# Patient Record
Sex: FEMALE | Race: WHITE | NOT HISPANIC OR LATINO | Employment: FULL TIME | URBAN - METROPOLITAN AREA
[De-identification: names, ages, dates, MRNs, and addresses within clinical notes are randomized per-mention and may not be internally consistent; named-entity substitution may affect disease eponyms.]

---

## 2017-01-10 ENCOUNTER — ALLSCRIPTS OFFICE VISIT (OUTPATIENT)
Dept: OTHER | Facility: OTHER | Age: 32
End: 2017-01-10

## 2017-01-30 ENCOUNTER — ALLSCRIPTS OFFICE VISIT (OUTPATIENT)
Dept: OTHER | Facility: OTHER | Age: 32
End: 2017-01-30

## 2017-03-01 ENCOUNTER — ALLSCRIPTS OFFICE VISIT (OUTPATIENT)
Dept: OTHER | Facility: OTHER | Age: 32
End: 2017-03-01

## 2017-04-05 ENCOUNTER — ALLSCRIPTS OFFICE VISIT (OUTPATIENT)
Dept: OTHER | Facility: OTHER | Age: 32
End: 2017-04-05

## 2017-04-20 ENCOUNTER — ALLSCRIPTS OFFICE VISIT (OUTPATIENT)
Dept: OTHER | Facility: OTHER | Age: 32
End: 2017-04-20

## 2017-04-24 ENCOUNTER — ALLSCRIPTS OFFICE VISIT (OUTPATIENT)
Dept: OTHER | Facility: OTHER | Age: 32
End: 2017-04-24

## 2017-05-15 ENCOUNTER — TRANSCRIBE ORDERS (OUTPATIENT)
Dept: ADMINISTRATIVE | Facility: HOSPITAL | Age: 32
End: 2017-05-15

## 2017-05-15 ENCOUNTER — ALLSCRIPTS OFFICE VISIT (OUTPATIENT)
Dept: OTHER | Facility: OTHER | Age: 32
End: 2017-05-15

## 2017-05-15 DIAGNOSIS — N64.4 MASTODYNIA: ICD-10-CM

## 2017-05-15 DIAGNOSIS — N64.4 BREAST PAIN: Primary | ICD-10-CM

## 2017-05-17 ENCOUNTER — HOSPITAL ENCOUNTER (OUTPATIENT)
Dept: MAMMOGRAPHY | Facility: CLINIC | Age: 32
Discharge: HOME | End: 2017-05-17
Payer: COMMERCIAL

## 2017-05-17 ENCOUNTER — LAB CONVERSION - ENCOUNTER (OUTPATIENT)
Dept: OTHER | Facility: OTHER | Age: 32
End: 2017-05-17

## 2017-05-17 ENCOUNTER — HOSPITAL ENCOUNTER (OUTPATIENT)
Dept: ULTRASOUND IMAGING | Facility: CLINIC | Age: 32
Discharge: HOME | End: 2017-05-17
Payer: COMMERCIAL

## 2017-05-17 DIAGNOSIS — N64.4 MASTODYNIA: ICD-10-CM

## 2017-05-17 LAB
A/G RATIO (HISTORICAL): 1.4 (CALC) (ref 1–2.5)
ALBUMIN SERPL BCP-MCNC: 4.6 G/DL (ref 3.6–5.1)
ALP SERPL-CCNC: 60 U/L (ref 33–115)
ALT SERPL W P-5'-P-CCNC: 14 U/L (ref 6–29)
AST SERPL W P-5'-P-CCNC: 18 U/L (ref 10–30)
BILIRUB SERPL-MCNC: 1 MG/DL (ref 0.2–1.2)
BUN SERPL-MCNC: 12 MG/DL (ref 7–25)
BUN/CREA RATIO (HISTORICAL): NORMAL (CALC) (ref 6–22)
CALCIUM SERPL-MCNC: 9.4 MG/DL (ref 8.6–10.2)
CHLORIDE SERPL-SCNC: 101 MMOL/L (ref 98–110)
CO2 SERPL-SCNC: 27 MMOL/L (ref 20–31)
CREAT SERPL-MCNC: 0.69 MG/DL (ref 0.5–1.1)
DEPRECATED RDW RBC AUTO: 13.6 % (ref 11–15)
EGFR AFRICAN AMERICAN (HISTORICAL): 134 ML/MIN/1.73M2
EGFR-AMERICAN CALC (HISTORICAL): 115 ML/MIN/1.73M2
GAMMA GLOBULIN (HISTORICAL): 3.2 G/DL (CALC) (ref 1.9–3.7)
GLUCOSE (HISTORICAL): 87 MG/DL (ref 65–99)
HCT VFR BLD AUTO: 40.9 % (ref 35–45)
HGB BLD-MCNC: 13.5 G/DL (ref 11.7–15.5)
MCH RBC QN AUTO: 30.9 PG (ref 27–33)
MCHC RBC AUTO-ENTMCNC: 33 G/DL (ref 32–36)
MCV RBC AUTO: 93.7 FL (ref 80–100)
PLATELET # BLD AUTO: 206 THOUSAND/UL (ref 140–400)
PMV BLD AUTO: 9.1 FL (ref 7.5–12.5)
POTASSIUM SERPL-SCNC: 4.2 MMOL/L (ref 3.5–5.3)
RBC # BLD AUTO: 4.37 MILLION/UL (ref 3.8–5.1)
SODIUM SERPL-SCNC: 136 MMOL/L (ref 135–146)
TOTAL PROTEIN (HISTORICAL): 7.8 G/DL (ref 6.1–8.1)
TSH SERPL DL<=0.05 MIU/L-ACNC: 2.59 MIU/L
WBC # BLD AUTO: 7.1 THOUSAND/UL (ref 3.8–10.8)

## 2017-05-17 PROCEDURE — 76642 ULTRASOUND BREAST LIMITED: CPT

## 2017-05-17 PROCEDURE — G0204 DX MAMMO INCL CAD BI: HCPCS

## 2017-05-18 ENCOUNTER — LAB CONVERSION - ENCOUNTER (OUTPATIENT)
Dept: OTHER | Facility: OTHER | Age: 32
End: 2017-05-18

## 2017-05-18 LAB
CONTACT: (HISTORICAL): NORMAL
LYME IGG/IGM AB (HISTORICAL): <0.9 INDEX
TEST INFORMATION (HISTORICAL): NORMAL
TEST NAME (HISTORICAL): NORMAL

## 2017-05-23 ENCOUNTER — GENERIC CONVERSION - ENCOUNTER (OUTPATIENT)
Dept: OTHER | Facility: OTHER | Age: 32
End: 2017-05-23

## 2017-06-23 ENCOUNTER — GENERIC CONVERSION - ENCOUNTER (OUTPATIENT)
Dept: OTHER | Facility: OTHER | Age: 32
End: 2017-06-23

## 2017-06-26 ENCOUNTER — ALLSCRIPTS OFFICE VISIT (OUTPATIENT)
Dept: OTHER | Facility: OTHER | Age: 32
End: 2017-06-26

## 2017-09-29 ENCOUNTER — GENERIC CONVERSION - ENCOUNTER (OUTPATIENT)
Dept: OTHER | Facility: OTHER | Age: 32
End: 2017-09-29

## 2018-01-09 NOTE — PSYCH
1  Depression with anxiety (300 4) (F41 8)    every other week       Date of Initial Treatment Plan: 9-28-16  Date of Current Treatment Plan: 1-30-17  Treatment Plan 2  Strengths/Personal Resources for Self Care: taxes done, ready to move, finished grad class, good support system  Diagnosis:   Axis I: depression, anxiety, greif reaction   Axis II: none   Axis III: none     Area of Needs: easily drained and overwhelmed as she continues grieving process and preps to move into "new" house  Long Term Goals: To get back into a routine personally and professionally   Target Date: 5-30-17              Short Term Objectives:   Goal 1:   Move into "new" house and get settled emotionally and physically  Target Date: 5-30-17      Goal 2:   work on self- new room, new bed and bedspread, making new space her own  Target Date: 4-10-17      Goal 3:   continuing to give self permission to slow down, take breaks, do one or two things daily and no pressure to do more  Target Date: 5-30-17      GOAL 1: Modality: Individual 2 x per month Target Date: 5-30-17       The person(s) responsible for carrying out the plan is Jonathan Healy  GOAL 2: Modality: Individual 2 x per month Target Date: 5-30-17       The person(s) responsible for carrying out the plan is Jonathan Healy  GOAL 3: Modality: Individual 2 x per month Target Date: 5-30-17         The person(s) responsible for carrying out the plan is Jonathan Healy  The first scheduled review date is 5-30-17  The expected length of service is 6 months  Level of functioning at initial assessment: 50  The highest level of functioning in the past year was 72  The current level of functioning is 70               CLIENT COMMENTS / Please share your thoughts, feelings, need and/or experiences regarding your treatment plan: _____________________________________________________________________________________________________________________________________________________________________________________________________________________________________________________________________________________________________________________ Date/Time: ______________     Patient Signature: _________________________________ Date/Time: ______________        1  Depression with anxiety (300 4) (F41 8)   2  Echogenic focus of heart, fetal, affecting care of mother, antepartum (655 83) (O35 8XX0)   3  Encounter for nuchal translucency testing (V28 89) (Z36)   4  Generalized anxiety disorder (300 02) (F41 1)   5  Grief reaction (309 0) (F43 20)   6  Hereditary Disease Possibly Affecting Fetus, Affecting Care Of Mother - Antepartum   Condition Or Complication (646 25)   7  Herpes simplex type 1 infection (054 9) (B00 9)   8  High risk medication use (V58 69) (Z79 899)   9  Influenza vaccine needed (V04 81) (Z23)   10  Major depressive disorder, single episode, severe without psychotic features (296 23)    (F32 2)   11   Panic Disorder Without Agoraphobia (300 01)     Electronically signed by : Linus Mcnamara, ; Jan 30 2017 11:38AM EST                       (Author)

## 2018-01-09 NOTE — PSYCH
1  Generalized anxiety disorder (300 02) (F41 1)   2  Grief reaction (309 0) (F43 20)    see regularly       Date of Initial Treatment Plan: 9-28-16  Date of Current Treatment Plan: 6-26-17  Treatment Plan 3  Strengths/Personal Resources for Self Care: I do well with being productive, taking last college class, all moved in to new house, staying busy with daughter and spending time with her doing fun things, Yoga is going awesome  I am most proud of "saying no" when I know something will be too much for me  Diagnosis:   Axis I: depression, anxiety, complicated bereavement   Axis II: none     Area of Needs: Stress, complicated bereavement and upcoming anniversary of husbands death  Long Term Goals:   Be able to focus on and enjoy life after all of the "1sts" - gettting my happiness back as close to normal as possible   Target Date: 10-26-17              Short Term Objectives:   Goal 1:   Start reading more - the more I read about other people's trajedys the better I am able to cope - connect to others stories  Target Date: 10-26-17      Goal 2:   Yoga, mindfulness, breathing, centering  Target Date: 10-26-17      Goal 3:   finish college course on mindfulness in the classroom to be able 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    Target Date: 10-26-17      GOAL 1: Modality: Individual 2 x per month Target Date: 10-26-17       The person(s) responsible for carrying out the plan is Julius Lares  GOAL 2: Modality: Individual 2 x per month Target Date: 10-26-17       The person(s) responsible for carrying out the plan is Juliuskaren Leahy Luana Luda  GOAL 3: Modality: Individual 2 x per month Target Date: 10-26-17         The person(s) responsible for carrying out the plan is Julius Lares  The first scheduled review date is 10-26-17  The expected length of service is 6 months  Level of functioning at initial assessment: 60      The highest level of functioning in the past year was 79  The current level of functioning is 60  CLIENT COMMENTS / Please share your thoughts, feelings, need and/or experiences regarding your treatment plan: _____________________________________________________________________________________________________________________________________________________________________________________________________________________________________________________________________________________________________________________ Date/Time: ______________     Patient Signature: _________________________________ Date/Time: ______________        1  Breast pain (611 71) (N64 4)   2  Generalized anxiety disorder (300 02) (F41 1)   3  Grief reaction (309 0) (F43 20)   4  History of Herpes simplex type 1 infection (054 9) (B00 9)   5  Major depressive disorder, single episode, severe without psychotic features (296 23)   (F32 2)   6  Post traumatic stress disorder (309 81) (F43 10)   7   Seborrheic dermatitis (690 10) (L21 9)     Electronically signed by : Gerber Morejon, ; Jun 26 2017 11:31AM EST                       (Author)
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
## 2018-01-11 NOTE — PSYCH
Progress Note  Psychotherapy Provided [facility]: Individual Psychotherapy 45 minutes provided today  Goals addressed in session:   all goals discussed and processed today in session - new tx plan updated today  D: Spent session updating tx plan as I have not seen Deyanira Alejo for therapy since April 24th  She did go to 2 grief groups at [facility] [place] as had discussed but was the youngest person there by about 36 years and while she got some good materials did not feel a true connection to the many elderly people who had been  for 40plus years  She is, however, finding great strength and help in her Yoga/mindfulness classes  Talked about her breathing she is learning while doing yoga and how it helps her every day with her anxiety  We discussed the upcoming anniversary of Jose E's death, her recent vacation with family at the beach and 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  Excited to have a new routine and get things moving in a new direction  A: Tearful talking about upcoming anniversary date of Jose E's death  MOving forward and making great progress  Doing well with assertiveness and self permissions  Pain Scale and Suicide Risk [facility]: Current Pain Assessment: no pain   On a scale of 0 to 10, the patient rates current pain at 0   Current suicide risk is low   Behavioral Health Treatment Plan [facility] [address]: Diagnosis and Treatment Plan explained to patient, patient relates understanding diagnosis and is agreeable to Treatment Plan  Assessment    1  Generalized anxiety disorder (300 02) (F41 1)   2   Grief reaction (309 0) (F43 20)    Plan  see again 1st week of August     Signatures   Electronically signed by : Jarad Ford, ; Jun 26 2017 11:48AM EST                       (Author)

## 2018-01-12 VITALS
DIASTOLIC BLOOD PRESSURE: 60 MMHG | HEIGHT: 67 IN | WEIGHT: 159 LBS | TEMPERATURE: 97.4 F | SYSTOLIC BLOOD PRESSURE: 110 MMHG | BODY MASS INDEX: 24.96 KG/M2
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
## 2018-01-12 NOTE — PSYCH
Provider Comments  Provider Comments:   patient called to cancel      Message   Recorded as Task   Date: 10/03/2016 07:58 AM, Created By: Fern Albert   Task Name: Miscellaneous   Assigned To: Harrison Rose   Regarding Patient: Tim Flores, Status: Active   Comment:    Fern Albert - 03 Oct 2016 7:58 AM     TASK CREATED  Pt cancelled for today, conflict with another appt  She will reschedule     Harrison Rose - 54 Oct 2016 8:08 AM     TASK EDITED     Signatures   Electronically signed by : Linda Lieberman, ; Oct  3 2016  8:09AM EST                       (Author)

## 2018-01-13 VITALS
TEMPERATURE: 98.6 F | DIASTOLIC BLOOD PRESSURE: 80 MMHG | SYSTOLIC BLOOD PRESSURE: 119 MMHG | BODY MASS INDEX: 24.33 KG/M2 | WEIGHT: 155 LBS | HEIGHT: 67 IN

## 2018-01-13 VITALS
WEIGHT: 157 LBS | SYSTOLIC BLOOD PRESSURE: 122 MMHG | TEMPERATURE: 98.5 F | DIASTOLIC BLOOD PRESSURE: 80 MMHG | HEIGHT: 67 IN | BODY MASS INDEX: 24.64 KG/M2

## 2018-01-15 NOTE — PSYCH
Progress Note  Psychotherapy Provided [facility]: Individual Psychotherapy 50 minutes provided today  Goals addressed in session:   Goal #1 addressed   D: Abundio Gregory shares that she has been overwhelmed with Monica Youngl and her birthdays and Thanksgiving this week and Tobyhanna coming up  Also end of HS football season  Processed all of this along with recent meeting in [place] with , ,   Processed grief, anxiety along with this  Discussed concerns she has about raising Monica Xiong  Processed her support network, decisions that have to be made for new house, and changes coming up  Did CBT and supportive therapy  A: Has been busy with holiday planning and birthdays  Also got [employer] done and she and family worked to get foundation dug as company was not moving on that  P: validate progress, listen to body and mind  take breaks, be open about feeligns  Pain Scale and Suicide Risk [facility]: Current Pain Assessment: no pain   On a scale of 0 to 10, the patient rates current pain at 0   Current suicide risk is low   Behavioral Health Treatment Plan [facility]: Diagnosis and Treatment Plan explained to patient, patient relates understanding diagnosis and is agreeable to Treatment Plan  Assessment    1   Grief reaction (309 0) (F43 20)    Plan  see in one week     Signatures   Electronically signed by : Samm Guo, ; Nov 21 2016 10:58AM EST                       (Author)
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
## 2018-01-15 NOTE — PSYCH
Treatment Plan Tracking    #1 Treatment Plan not completed within required time limits due to: Client presented with emotional/behavioral issues that required clinical intervention            Signatures   Electronically signed by : Deonte Morse, ; Marco 10 2017 11:59AM EST                       (Author)

## 2018-01-15 NOTE — PSYCH
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)  We processed her Thanksgiving in [place] and how that went - Aunt had picture of Marco A on the table and they toasted to him  She cried but said it was really good  Talked about how trying Eual Tessa is as she is now 1  Latoya feeling like she is a bad mom at times when she loses her patience  Feels like she "should not" use her parents or siblings or in laws for help but misses having a parenting partner  We did extensive CBT today and permission granting and distress tolerance around holiday expectations and traditions  She is meeting all week with contractors for the house (lighting, mallorie, trim, etc ) as they hope to be in house by end of January  Very busy with that  A: Leola Ruiz 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 as she did discuss today having 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 to see "how she felt" about it  P: Do not put pressure on self to exceed or even meet past holiday expectations  Set one or two goals for Seng Zarate and her over holidays and don't push beyond that  Ask for help and use the "team" around her  Pain Scale and Suicide Risk [facility]: Current Pain Assessment: no pain   On a scale of 0 to 10, the patient rates current pain at 0   Current suicide risk is low   Behavioral Health Treatment Plan [facility]: Diagnosis and Treatment Plan explained to patient, patient relates understanding diagnosis and is agreeable to Treatment Plan  Assessment    1   Depression with anxiety (300 4) (F41 8)    Plan  see in one week     Signatures   Electronically signed by : Rebeca Portillo, ; Nov 29 2016 12:02PM EST                       (Author)

## 2018-01-15 NOTE — PSYCH
Progress Note  Psychotherapy Provided [facility]: Individual Psychotherapy 46 minutes provided today  Goals addressed in session:   Goal #1 and 2 addressed  D: Met with Latoya for individual session  Since we last met she has been busy organizing and planning and taking care of herself  Worked hard on sleep hygiene per our last session - going to bed same time every night - about 9pm and journaling at bedtime  Taking Melatonin and drinking sleepy time tea and not taking the ativan  Says it is "so much better" and she is feeling really improved and is sleeping all night  She is walking more and has met with a friend for lunch  She talked with the bank and her  and hired an  through her  and is working on completing her will  Things better with Onelia's parents and she is having dinner with them tonight for first time in weeks  Has all of the money in her name now  Meeting with the [address] [place] [address] [employer]  owned half of end of this week to have partner "buy her out" for his half  She is considering giving money back to him but will think about it for a few days  Still gets sad  Cries more easily instead of feeling numb  Having T shirt quilts made for her, her daughter, her parents, his parents as he had hundreds of t shirts  She is meeting with contractor today about siding for the house  Talked about how difficult this is as she and onelia painted the siding together so changing it is hard  She says she is looking forward to moving back into her house  Her parents will take their bedroom and she will move into what was Onelia's Den  office  A: So much better - brighter affect, Sleeping seems to be helping with emoting and clarity of thought  P: Continue therapy to work through the grief process  Validate progress  Pain Scale and Suicide Risk [facility]: Current Pain Assessment: no pain   On a scale of 0 to 10, the patient rates current pain at 0   Current suicide risk is low      Behavioral Health Treatment Plan [facility]: Diagnosis and Treatment Plan explained to patient, patient relates understanding diagnosis and is agreeable to Treatment Plan  Assessment    1  Generalized anxiety disorder (300 02) (F41 1)   2   Grief reaction (309 0) (F43 20)    Plan  see in 2 weeks     Signatures   Electronically signed by : Pierre Chavez, ; Oct 12 2016 11:41AM EST                       (Author)
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
## 2018-01-16 NOTE — RESULT NOTES
Verified Results  *US BREAST RIGHT LIMITED (DIAGNOSTIC) [id number] 10:09AM Musa Ruiz     Test Name Result Flag Reference   US BREAST RIGHT LIMITED (Report)     Patient History:   Family history of breast cancer in paternal aunt, ovarian cancer    in paternal cousin, colorectal cancer in paternal uncle  Patient has never smoked  Patient's BMI is 24 9  Reason for exam: clinical finding  Mammo Diagnostic Bilateral W CAD: May 17, 2017 - Check In #:    [de-identified]   Bilateral MLO and CC view(s) were taken  ML, spot compression    MLO, and spot compression CC view(s) were taken of the right    breast      Technologist: ROSARIO Bhardwaj (ROSARIO)(M)   The breast tissue is heterogeneously dense, potentially limiting    the sensitivity of mammography  Patient risk, included in this    report, assists in determining the appropriate screening regimen    (such as 3-D mammography or the inclusion of automated breast    ultrasound or MRI)  3-D mammography may also remain indicated as    screening  Bilateral diagnostic mammography was performed  Square pain marker over the right breast upper-outer quadrant has   no subjacent mammographic abnormality  There are no suspicious masses, grouped microcalcifications or    areas of distortion in either breast      Diagnostic ultrasound of the right breast upper-outer quadrant    was performed by scanning from 9:00 to 12:00  At the area of    maximal pain 10:00 6 cm from nipple, there is no sonographic    abnormality  The remainder of the right breast upper-outer quadrant is    unremarkable  US Breast Right Limited: May 17, 2017 - Check In #: [de-identified]   Standard views  Technologist: Sofia Tello   1  No mammographic evidence of malignancy in either breast    2  No mammographic or sonographic findings to explain patient's    right breast upper-outer quadrant pain   I discussed possible    cause of breast pain with the patient and offered her reassurance   that most breast pain is self-limited  ACR BI-RADSï¾® Assessments: BiRad:1 - Negative (Overall)   Diag Mammogram: BiRad:1 - negative  Right breast Right Brst US: Right breast is BiRad:1 - negative  Recommendation:   Clinical management of both breasts  Routine screening mammogram of both breasts at age 35  Analyzed by CAD     Transcription Location: [facility] [address]   Signing Station: [id number]     Risk Value(s):   Sophieer-Cushellieck 10 Year: 1 300%, Tyrer-Cuzick Lifetime: 22 900%,    Myriad Table: 3 0%, MRS : Based on personal and/or    family history, consideration of hereditary risk assessment may    be warranted  Signed by:   Louann Dc MD   5/17/17     MAMMO DIAGNOSTIC BILATERAL W CAD [id number] 09:33AM Chris Guzman Order Number: [id number]    - Patient Instructions: To schedule this appointment, please contact Central Scheduling at [phone]  Do not wear any perfume, powder, lotion or deodorant on breast or underarm area  Please bring your doctors order, referral (if needed) and insurance information with you on the day of the test  Failure to bring this information may result in this test being rescheduled  Arrive 15 minutes prior to your appointment time to register  On the day of your test, please bring any prior mammogram or breast studies with you that were not performed at a [facility]  Failure to bring prior exams may result in your test needing to be rescheduled  Test Name Result Flag Reference   MAMMO DIAGNOSTIC BILATERAL W CAD (Report)     Patient History:   Family history of breast cancer in paternal aunt, ovarian cancer    in paternal cousin, colorectal cancer in paternal uncle  Patient has never smoked  Patient's BMI is 24 9  Reason for exam: clinical finding  Mammo Diagnostic Bilateral W CAD: May 17, 2017 - Check In #:    [de-identified]   Bilateral MLO and CC view(s) were taken   ML, spot compression    MLO, and spot compression CC view(s) were taken of the right    breast      Technologist: ROSARIO Talamantes (R)(M)   The breast tissue is heterogeneously dense, potentially limiting    the sensitivity of mammography  Patient risk, included in this    report, assists in determining the appropriate screening regimen    (such as 3-D mammography or the inclusion of automated breast    ultrasound or MRI)  3-D mammography may also remain indicated as    screening  Bilateral diagnostic mammography was performed  Square pain marker over the right breast upper-outer quadrant has   no subjacent mammographic abnormality  There are no suspicious masses, grouped microcalcifications or    areas of distortion in either breast      Diagnostic ultrasound of the right breast upper-outer quadrant    was performed by scanning from 9:00 to 12:00  At the area of    maximal pain 10:00 6 cm from nipple, there is no sonographic    abnormality  The remainder of the right breast upper-outer quadrant is    unremarkable  US Breast Right Limited: May 17, 2017 - Check In #: [de-identified]   Standard views  Technologist: Bernabe Mroeno   1  No mammographic evidence of malignancy in either breast    2  No mammographic or sonographic findings to explain patient's    right breast upper-outer quadrant pain  I discussed possible    cause of breast pain with the patient and offered her reassurance   that most breast pain is self-limited  ACR BI-RADSï¾® Assessments: BiRad:1 - Negative (Overall)   Diag Mammogram: BiRad:1 - negative  Right breast Right Brst US: Right breast is BiRad:1 - negative  Recommendation:   Clinical management of both breasts  Routine screening mammogram of both breasts at age 35     Analyzed by CAD     Transcription Location: [address]   Signing Station: [id number]     Risk Value(s):   Shilpa-Haroon 10 Year: 1 300%, Sophieer-Haroon Lifetime: 22 900%,    Myriad Table: 3 0%, MRS : Based on personal and/or    family history, consideration of hereditary risk assessment may    be warranted  Signed by:   Rodger Melchor MD   5/17/17     (1) COMPREHENSIVE METABOLIC PANEL [id number] 36:84AZ Musa Estrad[place]atha     Test Name Result Flag Reference   GLUCOSE 87 mg/dL  65-99   Fasting reference interval   UREA NITROGEN (BUN) 12 mg/dL  7-25   CREATININE 0 69 mg/dL  0 50-1 10   eGFR NON-AFR   AMERICAN 115 mL/min/1 73m2  > OR = 60   eGFR AFRICAN AMERICAN 134 mL/min/1 73m2  > OR = 60   BUN/CREATININE RATIO   4-96   NOT APPLICABLE (calc)   SODIUM 136 mmol/L  135-146   POTASSIUM 4 2 mmol/L  3 5-5 3   CHLORIDE 101 mmol/L     CARBON DIOXIDE 27 mmol/L  20-31   CALCIUM 9 4 mg/dL  8 6-10 2   PROTEIN, TOTAL 7 8 g/dL  6 1-8 1   ALBUMIN 4 6 g/dL  3 6-5 1   GLOBULIN 3 2 g/dL (calc)  1 9-3 7   ALBUMIN/GLOBULIN RATIO 1 4 (calc)  1 0-2 5   BILIRUBIN, TOTAL 1 0 mg/dL  0 2-1 2   ALKALINE PHOSPHATASE 60 U/L     AST 18 U/L  10-30   ALT 14 U/L  6-29     (Q) CBC (H/H, RBC, INDICES, WBC, PLT) [id number] 12:00AM Lokesh Bustillo     Test Name Result Flag Reference   WHITE BLOOD CELL COUNT 7 1 Thousand/uL  3 8-10 8   RED BLOOD CELL COUNT 4 37 Million/uL  3 80-5 10   HEMOGLOBIN 13 5 g/dL  11 7-15 5   HEMATOCRIT 40 9 %  35 0-45 0   MCV 93 7 fL  80 0-100 0   MCH 30 9 pg  27 0-33 0   MCHC 33 0 g/dL  32 0-36 0   RDW 13 6 %  11 0-15 0   PLATELET COUNT 620 Thousand/uL  140-400   MPV 9 1 fL  7 5-12 5     (Q) TSH, 3RD GENERATION W/REFLEX TO FT4 [id number] 12:00AM Lokesh Bustillo     Test Name Result Flag Reference   TSH W/REFLEX TO FT4 2 59 mIU/L     Reference Range                         > or = 20 Years  0 40-4 50                              Pregnancy Ranges            First trimester    0 26-2 66            Second trimester   0 55-2 73            Third trimester    0 43-2 91     (Q) LYME DISEASE AB, TOTAL W/REFL WB (IGG, IGM) [id number] 12:00AM [place]ann[place]isa Ruiz     Test Name Result Flag Reference   LYME AB SCREEN <0 90 index     Index                Interpretation                     ----- --------------                     < 0 90               Negative                     0  90-1 09            Equivocal                     > 1 09               Positive      As recommended by the Food and Drug Administration   (FDA), all samples with positive or equivocal   results in a Borrelia burgdorferi antibody screen  will be tested using a blot method  Positive or   equivocal screening test results should not be   interpreted as truly positive until verified as such   using a supplemental assay (e g , B  burgdorferi blot)  The screening test and/or blot for B  burgdorferi   antibodies may be falsely negative in early stages  of Lyme disease, including the period when erythema   migrans is apparent  (Q) TEST AUTHORIZATION [id number] 12:00AM Dontrell Cadena     Test Name Result Flag Reference   TEST(S) ORDERED ON$REQUISITION      LYME DISEASE AB W/REFL   TEST CODE: [id number]     CLIENT CONTACT: ANNE LOUIE     REPORT ALWAYS MESSAGE$SIGNATURE See Below     The laboratory testing on this patient was verbally requested  or confirmed by the ordering physician or his or her authorized  representative after contact with an employee of [employer]  Federal regulations require that we maintain on file written  authorization for all laboratory testing  Accordingly we are asking  that the ordering physician or his or her authorized representative  sign a copy of this report and promptly return it to the client            Signature:____________________________________________________
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
## 2018-01-17 NOTE — PSYCH
Treatment Plan Tracking    #1 Treatment Plan not completed within required time limits due to: Client presented with emotional/behavioral issues that required clinical intervention  , Other: evaluation today - distraught - grieving  - will do tx plan next session            Signatures   Electronically signed by : Kiana Norton, ; Sep 15 2016 12:07PM EST                       (Author)
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
## 2018-01-18 NOTE — PROGRESS NOTES
Assessment    1  Generalized anxiety disorder (300 02) (F41 1)   2  High risk medication use (V58 69) (Z79 899)    Plan  Generalized anxiety disorder    · Sertraline HCl - 25 MG Oral Tablet; TAKE 1 TABLET DAILY   · Decreasing the stress in your life may help your condition improve ; Status:Complete;    Done: 39TCO4008 04:49PM   · There are many things you can do to help control and end a panic attack ;  Status:Complete;   Done: 18HJZ1645 04:49PM   · Follow-up visit in 6 months Evaluation and Treatment  Follow-up  Status: Hold For -  Scheduling  Requested for: 13SDF2752    Discussion/Summary  Possible side effects of new medications were reviewed with the patient/guardian today  The treatment plan was reviewed with the patient/guardian  The patient/guardian understands and agrees with the treatment plan   The patient was counseled regarding diagnostic results, risk factor reductions, prognosis, impressions, risks and benefits of treatment options  Chief Complaint  3 month recheck      History of Present Illness  The patient is being seen for follow-up of anxiety  The patient reports doing well  She has no comorbid illnesses  Interval symptoms:  stable difficulty concentrating, stable restlessness, denies panic attacks and denies depression    The patient presents with complaints of gradual onset of intermittent episodes of moderate stable anxiety  Episodes years ago  Her symptoms are caused by no known event  Symptoms are improving  Pertinent Medical History: anxiety disorder  Associated symptoms: no suicidal ideation  Medication(s): Sertralin 25 mg daily  Medications:  the patient is adherent to her medication regimen, but she denies medication side effects  Review of Systems    Constitutional: as noted in HPI  Cardiovascular: No complaints of slow heart rate, no fast heart rate, no chest pain, no palpitations, no leg claudication, no lower extremity edema     Respiratory: No complaints of shortness of breath, no wheezing, no cough, no SOB on exertion, no orthopnea, no PND  Psychiatric: as noted in HPI  Active Problems    1  Echogenic focus of heart, fetal, affecting care of mother, antepartum (655 83) (O35 8XX0)   2  Encounter for nuchal translucency testing (V28 89) (Z36)   3  Generalized anxiety disorder (300 02) (F41 1)   4  Hereditary Disease Possibly Affecting Fetus, Affecting Care Of Mother - Antepartum   Condition Or Complication (744 80)   5  Herpes simplex type 1 infection (054 9) (B00 9)   6  Influenza vaccine needed (V04 81) (Z23)   7  Panic Disorder Without Agoraphobia (300 01)    Past Medical History    1  History of _   2  History of _   3  History of _   4  History of _   5  History of Cellulitis (682 9) (L03 90)   6  History of Diphtheria-tetanus-pertussis (DTP) vaccination (V06 1) (Z23)   7  History of Dysfunction of eustachian tube, unspecified laterality (381 81) (H69 80)   8  History of acute bronchitis (V12 69) (Z87 09)   9  History of folliculitis (B58 6) (R16 3)   10  History of vertigo (V12 49) (Z87 898)   11  History of Otalgia, unspecified laterality (388 70) (H92 09)   12  History of Vulvovaginitis (616 10) (N76 0)    The active problems and past medical history were reviewed and updated today  Family History    1  Family history of diabetes mellitus (V18 0) (Z83 3)    The family history was reviewed and updated today  Social History    · Marital History - Currently    · Never A Smoker   · Occupation:  The social history was reviewed and updated today  Current Meds   1  Sertraline HCl - 25 MG Oral Tablet; TAKE 1 TABLET DAILY; Therapy: 53PIF2229 to (Evaluate:21Jan2016)  Requested for: 52YDM7153; Last   Rx:23Oct2015 Ordered    The medication list was reviewed and updated today  Allergies    1  Avelox TABS    2  No Known Environmental Allergies   3   No Known Food Allergies    Vitals  Vital Signs [Data Includes: Current Encounter] Recorded: 37YWN7071 04:31PM   Heart Rate 72   Systolic 918   Diastolic 74   Height 5 ft 7 in   Weight 154 lb    BMI Calculated 24 12   BSA Calculated 1 81     Physical Exam    Constitutional   General appearance: No acute distress, well appearing and well nourished  Pulmonary   Auscultation of lungs: Clear to auscultation  Cardiovascular   Auscultation of heart: Normal rate and rhythm, normal S1 and S2, without murmurs  Examination of extremities for edema and/or varicosities: Normal     Abdomen   Abdomen: Non-tender, no masses  Liver and spleen: No hepatomegaly or splenomegaly  Neurologic   Cranial nerves: Cranial nerves 2-12 intact  Reflexes: 2+ and symmetric      Psychiatric   Orientation to person, place, and time: Normal     Mood and affect: Normal          Signatures   Electronically signed by : Isamar Dubon MD; Jan 28 2016  4:51PM EST                       (Author)

## 2018-02-05 DIAGNOSIS — F41.9 ANXIETY: Primary | ICD-10-CM

## 2018-03-07 NOTE — PSYCH
History of Present Illness    Discharge Summary: Admission Date: 9-15-16    Patient was referred by self   Discharge Date: 17  This was a routine discharge  Called here for intake - knew of office through friends  Discharge Diagnosis:    Axis I: anxiety, greif reaction   Axis II: none   Axis III: none   Axis IV: moderate  Treating Physician: Kavitha Clark is treating psychotherapist    Treatment Complications: none Admit: 48  Discharge: 75    Prognosis at time of discharge is excellent  Presenting Problem/Pertinent findings:  Spouse  suddenly and tragically in 2016 - Cheryl Johnson witnessed his death  She had depression, anxiety, sleep problems as result along with grief and loss symptoms  Course of treatment includes  individual counseling and eclectic  Treatment Progress: Cheryl Johnson was in therapy for approx    9 months with this therapist - her last attended session with me was 2017  She along with therapy she did yoga and tried a bereavement group  The consumer achieved all of their goals  Criteria for Discharge: The patient has  completed treatment goals and objectives and is no longer in need of services  Aftercare recommendations include:  Cheryl Johnson may return to Therapy at any time in the future as needed  She will continue yoga and has started working again  Discharge Medications include: None by [facility]     Prognosis: Excellent - has a large support system, has returned to work and has started to get her young daughter involved in pre school and dance class  Going to football games and talking with family and friends about needs  Active Problems    1  Breast pain (611 71) (N64 4)   2  Generalized anxiety disorder (300 02) (F41 1)   3  Grief reaction (309 0) (F43 20)   4  History of Herpes simplex type 1 infection (054 9) (B00 9)   5  Major depressive disorder, single episode, severe without psychotic features (296 23)   (F32 2)   6   Post traumatic stress disorder (309 81) (F43 10)   7  Seborrheic dermatitis (690 10) (L21 9)    Past Medical History    1  History of Cellulitis (682 9) (L03 90)   2  History of Diphtheria-tetanus-pertussis (DTP) vaccination (V06 1) (Z23)   3  History of Dysfunction of Eustachian tube, unspecified laterality (381 81) (H69 80)   4  History of Herpes simplex type 1 infection (054 9) (B00 9)   5  History of acute bronchitis (V12 69) (Z87 09)   6  History of folliculitis (V07 4) (J64 0)   7  History of vertigo (V12 49) (Z87 898)   8  History of Otalgia, unspecified laterality (388 70) (H92 09)   9  History of Vulvovaginitis (616 10) (N76 0)    The active problems and past medical history were reviewed and updated today  Social History    · Marital History - Currently    · Never A Smoker   · No alcohol use   · Occupation:   ·   The social history was reviewed and updated today  Allergies    1  Avelox TABS    2  No Known Environmental Allergies   3  No Known Food Allergies    Current Meds   1  Fluocinonide 0 05 % External Solution; APPLY SPARINGLY TO SCALP TWICE DAILY; Therapy: 07HQW1225 to (Last Rx:05Apr2017)  Requested for: 05Apr2017 Ordered   2  LORazepam 0 5 MG Oral Tablet; Take 1/2-1 tablet 3 times daily as needed; Therapy: 49QBZ7928 to (Evaluate:02Cxd2259); Last Rx:44Gqy2324 Ordered   3  Selenium Sulfide 2 25 % External Shampoo; SHAMPOO HAIR/SCALP AS DIRECTED; Therapy: 34Vsa5611 to (Evaluate:04Lin9881)  Requested for: 20Apr2017; Last   Rx:41Ifr5211 Ordered   4  Sertraline HCl - 50 MG Oral Tablet; TAKE 1 TABLET DAILY AS DIRECTED; Therapy: 99ASE1123 to 490 08 485)  Requested for: 05Apr2017; Last   Rx:76Aik9722 Ordered    The medication list was reviewed and updated today  Assessment    1   Adjustment disorder with anxious mood (309 24) (F43 22)    Signatures   Electronically signed by : Paola Hinkle, ; Sep 29 2017 10:27AM EST                       (Author)    Electronically signed by : Jose Emmanuel MONIKA Smith ; Sep 29 2017 11:39AM EST                       (Author)

## 2018-03-27 DIAGNOSIS — F41.9 ANXIETY: ICD-10-CM

## 2018-05-15 ENCOUNTER — OFFICE VISIT (OUTPATIENT)
Dept: FAMILY MEDICINE CLINIC | Facility: CLINIC | Age: 33
End: 2018-05-15
Payer: COMMERCIAL

## 2018-05-15 VITALS
SYSTOLIC BLOOD PRESSURE: 110 MMHG | BODY MASS INDEX: 25.38 KG/M2 | OXYGEN SATURATION: 97 % | TEMPERATURE: 97.7 F | WEIGHT: 162.03 LBS | HEART RATE: 55 BPM | DIASTOLIC BLOOD PRESSURE: 72 MMHG

## 2018-05-15 DIAGNOSIS — F41.9 ANXIETY: ICD-10-CM

## 2018-05-15 PROBLEM — F43.22 ADJUSTMENT DISORDER WITH ANXIOUS MOOD: Status: ACTIVE | Noted: 2017-09-29

## 2018-05-15 PROBLEM — N64.4 BREAST PAIN: Status: ACTIVE | Noted: 2017-05-15

## 2018-05-15 PROBLEM — F43.10 POST TRAUMATIC STRESS DISORDER: Status: ACTIVE | Noted: 2017-04-24

## 2018-05-15 PROBLEM — L21.9 SEBORRHEIC DERMATITIS: Status: ACTIVE | Noted: 2017-04-05

## 2018-05-15 PROCEDURE — 1036F TOBACCO NON-USER: CPT | Performed by: FAMILY MEDICINE

## 2018-05-15 PROCEDURE — 99213 OFFICE O/P EST LOW 20 MIN: CPT | Performed by: FAMILY MEDICINE
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
## 2019-01-09 ENCOUNTER — DOCUMENTATION (OUTPATIENT)
Dept: BEHAVIORAL/MENTAL HEALTH CLINIC | Facility: CLINIC | Age: 34
End: 2019-01-09
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
## 2019-02-25 DIAGNOSIS — F41.9 ANXIETY: ICD-10-CM

## 2019-03-04 DIAGNOSIS — F41.9 ANXIETY: ICD-10-CM

## 2019-03-18 ENCOUNTER — OFFICE VISIT (OUTPATIENT)
Dept: FAMILY MEDICINE CLINIC | Facility: CLINIC | Age: 34
End: 2019-03-18
Payer: COMMERCIAL

## 2019-03-18 ENCOUNTER — APPOINTMENT (EMERGENCY)
Dept: ULTRASOUND IMAGING | Facility: HOSPITAL | Age: 34
End: 2019-03-18
Payer: COMMERCIAL

## 2019-03-18 ENCOUNTER — HOSPITAL ENCOUNTER (EMERGENCY)
Facility: HOSPITAL | Age: 34
Discharge: HOME | End: 2019-03-18
Attending: EMERGENCY MEDICINE | Admitting: EMERGENCY MEDICINE
Payer: COMMERCIAL

## 2019-03-18 VITALS
OXYGEN SATURATION: 97 % | HEART RATE: 96 BPM | SYSTOLIC BLOOD PRESSURE: 120 MMHG | DIASTOLIC BLOOD PRESSURE: 70 MMHG | BODY MASS INDEX: 25.37 KG/M2 | WEIGHT: 162 LBS | TEMPERATURE: 98.7 F

## 2019-03-18 VITALS
HEART RATE: 66 BPM | SYSTOLIC BLOOD PRESSURE: 131 MMHG | TEMPERATURE: 98.9 F | DIASTOLIC BLOOD PRESSURE: 75 MMHG | OXYGEN SATURATION: 98 % | RESPIRATION RATE: 18 BRPM

## 2019-03-18 DIAGNOSIS — D25.9 UTERINE FIBROID: ICD-10-CM

## 2019-03-18 DIAGNOSIS — R10.2 PELVIC PAIN: ICD-10-CM

## 2019-03-18 DIAGNOSIS — F41.1 GENERALIZED ANXIETY DISORDER: ICD-10-CM

## 2019-03-18 DIAGNOSIS — R10.31 RIGHT LOWER QUADRANT ABDOMINAL PAIN: Primary | ICD-10-CM

## 2019-03-18 DIAGNOSIS — R10.31 RLQ ABDOMINAL PAIN: ICD-10-CM

## 2019-03-18 DIAGNOSIS — F41.9 ANXIETY: ICD-10-CM

## 2019-03-18 DIAGNOSIS — R10.2 PELVIC PAIN IN FEMALE: Primary | ICD-10-CM

## 2019-03-18 LAB
BACTERIA UR QL AUTO: ABNORMAL /HPF
BILIRUB UR QL STRIP: NEGATIVE
CLARITY UR: CLEAR
COLOR UR: YELLOW
EXT PREG TEST URINE: NEGATIVE
GLUCOSE UR STRIP-MCNC: NEGATIVE MG/DL
HGB UR QL STRIP.AUTO: ABNORMAL
KETONES UR STRIP-MCNC: ABNORMAL MG/DL
LEUKOCYTE ESTERASE UR QL STRIP: ABNORMAL
NITRITE UR QL STRIP: NEGATIVE
NON-SQ EPI CELLS URNS QL MICRO: ABNORMAL /HPF
PH UR STRIP.AUTO: 6.5 [PH] (ref 4.5–8)
PROT UR STRIP-MCNC: NEGATIVE MG/DL
RBC #/AREA URNS AUTO: ABNORMAL /HPF
SL AMB  POCT GLUCOSE, UA: ABNORMAL
SL AMB LEUKOCYTE ESTERASE,UA: ABNORMAL
SL AMB POCT BILIRUBIN,UA: ABNORMAL
SL AMB POCT BLOOD,UA: ABNORMAL
SL AMB POCT CLARITY,UA: ABNORMAL
SL AMB POCT COLOR,UA: ABNORMAL
SL AMB POCT KETONES,UA: ABNORMAL
SL AMB POCT NITRITE,UA: ABNORMAL
SL AMB POCT PH,UA: 6.5
SL AMB POCT SPECIFIC GRAVITY,UA: 1.01
SL AMB POCT URINE PROTEIN: ABNORMAL
SL AMB POCT UROBILINOGEN: 0.2
SP GR UR STRIP.AUTO: 1.01 (ref 1–1.03)
UROBILINOGEN UR QL STRIP.AUTO: 0.2 E.U./DL
WBC #/AREA URNS AUTO: ABNORMAL /HPF

## 2019-03-18 PROCEDURE — 99214 OFFICE O/P EST MOD 30 MIN: CPT | Performed by: FAMILY MEDICINE

## 2019-03-18 PROCEDURE — 81001 URINALYSIS AUTO W/SCOPE: CPT

## 2019-03-18 PROCEDURE — 81025 URINE PREGNANCY TEST: CPT | Performed by: EMERGENCY MEDICINE

## 2019-03-18 PROCEDURE — 81002 URINALYSIS NONAUTO W/O SCOPE: CPT | Performed by: FAMILY MEDICINE

## 2019-03-18 PROCEDURE — 76830 TRANSVAGINAL US NON-OB: CPT

## 2019-03-18 PROCEDURE — 99284 EMERGENCY DEPT VISIT MOD MDM: CPT

## 2019-03-18 PROCEDURE — 81003 URINALYSIS AUTO W/O SCOPE: CPT

## 2019-03-18 PROCEDURE — 76856 US EXAM PELVIC COMPLETE: CPT

## 2019-03-18 PROCEDURE — 1036F TOBACCO NON-USER: CPT | Performed by: FAMILY MEDICINE

## 2019-03-18 NOTE — ASSESSMENT & PLAN NOTE
The patient is a 26-year-old female with approximately 16 hours of acute right pelvic pain  I believe that based on her history and examination that this most likely represents a ruptured ovarian cyst though she has no history of same  Could not rule out other ovarian pathology such as torsion  Based on her history and examination I feel acute appendicitis is unlikely but cannot completely rule this out  We did attempt to schedule a stat ultrasound as an outpatient but there was no availability in any local [facility] facilities  Based on this I feel it is appropriate to refer her to the [facility] Emergency Room for further evaluation  We called ahead to advise her as to her situation  Patient and mother are in agreement

## 2019-03-18 NOTE — ED PROVIDER NOTES
History  Chief Complaint   Patient presents with    Abdominal Pain     Pt reports RLQ pain, near groin, that started this AM in the night, woke pt from sleeping  Pt denies urinary symptoms  Pt reports " I just finished my period" Pt saw PCP and wanted US  65-year-old female presents today complaining of right lower quadrant pain which started this morning  States the pain woke her from sleep and has been intermittent throughout the day  Denies urinary symptoms  Denies vaginal discharge or vaginal bleeding  Last menstrual period ended 3 days ago  Saw her primary care physician this afternoon who wanted her to have an ultrasound, there was no ultrasound appointment available today, so patient was sent to the emergency department for further evaluation  Patient denies fever  P o  Intake has been normal       History provided by:  Patient  Abdominal Pain   Pain location:  RLQ  Pain quality: sharp    Pain radiates to:  Does not radiate  Pain severity:  Mild  Onset quality:  Sudden  Timing:  Intermittent  Chronicity:  New  Context comment:  See HPI  Relieved by:  None tried  Worsened by:  Nothing  Ineffective treatments:  None tried  Associated symptoms: no anorexia, no chills, no dysuria, no fatigue, no fever, no shortness of breath and no sore throat    Risk factors: not elderly, has not had multiple surgeries and no recent hospitalization        Prior to Admission Medications   Prescriptions Last Dose Informant Patient Reported? Taking?   sertraline (ZOLOFT) 50 mg tablet   No No   Sig: Take 1 tablet (50 mg total) by mouth daily      Facility-Administered Medications: None       Past Medical History:   Diagnosis Date    Grief reaction 8/10/2016    Herpes simplex type 1 infection     last assessed 8/8/13, resolved 4/20/17        History reviewed  No pertinent surgical history      Family History   Problem Relation Age of Onset    Diabetes Father     Breast cancer Maternal Aunt      I have reviewed and agree with the history as documented  Social History     Tobacco Use    Smoking status: Never Smoker    Smokeless tobacco: Never Used   Substance Use Topics    Alcohol use: Yes     Comment: social    Drug use: No        Review of Systems   Constitutional: Negative for chills, fatigue and fever  HENT: Negative for postnasal drip, sore throat and trouble swallowing  Respiratory: Negative for chest tightness and shortness of breath  Gastrointestinal: Positive for abdominal pain  Negative for anorexia  Genitourinary: Negative for dysuria  Musculoskeletal: Negative for back pain  Skin: Negative for rash  Allergic/Immunologic: Negative for immunocompromised state  Neurological: Negative for dizziness, light-headedness and headaches  Psychiatric/Behavioral: Negative for confusion  Physical Exam  Physical Exam   Constitutional: She is oriented to person, place, and time  She appears well-developed and well-nourished  HENT:   Head: Normocephalic and atraumatic  Mouth/Throat: Uvula is midline, oropharynx is clear and moist and mucous membranes are normal  No tonsillar exudate  Eyes: Pupils are equal, round, and reactive to light  Neck: Normal range of motion  Neck supple  Cardiovascular: Normal rate and regular rhythm  Pulmonary/Chest: Effort normal and breath sounds normal    Abdominal: Soft  Bowel sounds are normal  There is no tenderness  There is no rebound and no guarding  Genitourinary: Right adnexum displays tenderness (Mild, on external exam)  Musculoskeletal: She exhibits no edema, tenderness or deformity  Neurological: She is alert and oriented to person, place, and time  Patient moving all extremities equally, no focal neuro deficits noted  Skin: Skin is warm and dry  Psychiatric: She has a normal mood and affect  Nursing note and vitals reviewed        Vital Signs  ED Triage Vitals [03/18/19 1801]   Temperature Pulse Respirations Blood Pressure SpO2 98 9 °F (37 2 °C) 66 18 131/75 98 %      Temp Source Heart Rate Source Patient Position - Orthostatic VS BP Location FiO2 (%)   Oral Monitor Sitting Right arm --      Pain Score       5           Vitals:    03/18/19 1801   BP: 131/75   Pulse: 66   Patient Position - Orthostatic VS: Sitting       qSOFA     Row Name 03/18/19 1801 03/18/19 1656             Altered mental status GCS < 15  --  --       Respiratory Rate > / =22  0  --       Systolic BP < / =635  0  0       Q Sofa Score  0  --             Visual Acuity      ED Medications  Medications - No data to display    Diagnostic Studies  Results Reviewed     Procedure Component Value Units Date/Time    Urine Microscopic [[id number]]  (Abnormal) Collected:  03/18/19 1823    Lab Status:  Final result Specimen:  Urine, Clean Catch Updated:  03/18/19 1856     RBC, UA None Seen /hpf      WBC, UA 0-1 /hpf      Epithelial Cells Occasional /hpf      Bacteria, UA Occasional /hpf     POCT pregnancy, urine [[id number]]  (Normal) Resulted:  03/18/19 1825    Lab Status:  Final result Updated:  03/18/19 1825     EXT PREG TEST UR (Ref: Negative) negative    ED Urine Macroscopic [[id number]]  (Abnormal) Collected:  03/18/19 1823    Lab Status:  Final result Specimen:  Urine Updated:  03/18/19 1823     Color, UA Yellow     Clarity, UA Clear     pH, UA 6 5     Leukocytes, UA Trace     Nitrite, UA Negative     Protein, UA Negative mg/dl      Glucose, UA Negative mg/dl      Ketones, UA Trace mg/dl      Urobilinogen, UA 0 2 E U /dl      Bilirubin, UA Negative     Blood, UA Small     Specific [place], UA 1 015    Narrative:       CLINITEK RESULT                 US pelvis complete w transvaginal   Final Result by Macie Santos MD (03/18 1857)       Subcentimeter uterine fibroid  Nonspecific heterogeneous uterine echotexture as described above  Left adnexal varices                        Workstation performed: [id number]                    Procedures  Procedures       Phone Contacts  ED Phone Contact    ED Course                               MDM  Number of Diagnoses or Management Options  Pelvic pain:   Right lower quadrant abdominal pain: new and requires workup  Uterine fibroid:   Diagnosis management comments: 6:12 PM  Patient presents with right lower quadrant pain that started this morning  Patient is nontoxic-appearing  She is nontender over McBurney's point  Has a history of ruptured ovarian cysts, and I suspect this the etiology of her recurrent pain  She has not taken anything for pain prior to going to her PCPs office today  She was given 2 ibuprofen prior to coming to the emergency department  Will check a UA and a pelvic ultrasound and re-evaluate  7:05 PM  Ultrasound shows a uterine fibroid, and a small amount of free fluid which may be consistent with a ruptured ovarian cyst   Patient is resting comfortably and eating pretzels  Patient is stable for discharge  Return to ED instructions reviewed  Follow up with OBGYN as an outpatient         Amount and/or Complexity of Data Reviewed  Clinical lab tests: ordered and reviewed  Tests in the radiology section of CPT®: ordered and reviewed    Risk of Complications, Morbidity, and/or Mortality  Presenting problems: moderate  Diagnostic procedures: moderate  Management options: moderate    Patient Progress  Patient progress: stable      Disposition  Final diagnoses:   Right lower quadrant abdominal pain   Uterine fibroid   Pelvic pain     Time reflects when diagnosis was documented in both MDM as applicable and the Disposition within this note     Time User Action Codes Description Comment    3/18/2019  6:12 PM Stephania White Add [R10 31] Right lower quadrant abdominal pain     3/18/2019  7:03 PM Selma Ospina Add [D25 9] Uterine fibroid     3/18/2019  7:04 PM Selma [place] Add [R10 2] Pelvic pain       ED Disposition     ED Disposition Condition Date/Time Comment    Discharge Stable Mon Mar 18, 2019  7:04 PM Laura Gallardo discharge to home/self care  Follow-up Information     Follow up With Specialties Details Why Contact Info Additional Information    Delia Guerra MD Family Medicine Schedule an appointment as soon as possible for a visit   [address] [facility] [place] [facility]  [phone]       Your OBGYN  Schedule an appointment as soon as possible for a visit        [facility] Emergency Department Emergency Medicine  If symptoms worsen [address] [facility]  AN ED, [address], [place], [place], [address]          Patient's Medications   Discharge Prescriptions    No medications on file     No discharge procedures on file      ED Provider  Electronically Signed by           Richard Dickson DO  03/18/19 8696

## 2019-03-18 NOTE — PROGRESS NOTES
Assessment/Plan:  Pelvic pain in female  The patient is a 50-year-old female with approximately 16 hours of acute right pelvic pain  I believe that based on her history and examination that this most likely represents a ruptured ovarian cyst though she has no history of same  Could not rule out other ovarian pathology such as torsion  Based on her history and examination I feel acute appendicitis is unlikely but cannot completely rule this out  We did attempt to schedule a stat ultrasound as an outpatient but there was no availability in any local [facility] facilities  Based on this I feel it is appropriate to refer her to the [facility] Emergency Room for further evaluation  We called ahead to advise her as to her situation  Patient and mother are in agreement  Generalized anxiety disorder  We did refill her sertraline today  Diagnoses and all orders for this visit:    Pelvic pain in female  -     US pelvis complete non OB; Future    RLQ abdominal pain  -     POCT urine dip    Anxiety  -     sertraline (ZOLOFT) 50 mg tablet; Take 1 tablet (50 mg total) by mouth daily    Generalized anxiety disorder          Subjective:   Chief Complaint   Patient presents with    Abdominal Pain     RLQ side last nite  Patient ID: Josef Bush is a 28 y o  female  HPI  The patient is a 50-year-old female who presents today having developed acute right lower quadrant pain in the early morning hours  She has had a feeling of bloating and some anorexia but no vomiting  Mother who is an RN felt that she had a low-grade fever  She finished her menses 2-3 days ago  She has no vaginal bleeding currently  She has no dysuria frequency or flank pain  She has no history of ovarian sister other gynecologic pathology  She does have anxiety/panic disorder  She requests refill of her sertraline today  She has been out for several days and her anxiety/panic is returning    Prior to this it was well controlled while taking sertraline regularly  She is not suicidal   The following portions of the patient's history were reviewed and updated as appropriate: allergies, current medications, past family history, past medical history, past social history, past surgical history and problem list     Review of Systems   Constitution: Negative for chills, decreased appetite, fever and malaise/fatigue  Cardiovascular: Negative for chest pain and leg swelling  Respiratory: Negative for shortness of breath  Hematologic/Lymphatic: Negative for adenopathy and bleeding problem  Skin: Negative for rash  Gastrointestinal: Positive for bloating, abdominal pain and anorexia  Negative for nausea and vomiting  Genitourinary: Positive for pelvic pain  Negative for bladder incontinence, dysuria, flank pain, frequency, hematuria, missed menses and urgency  Psychiatric/Behavioral: Negative for suicidal ideas  The patient is nervous/anxious  Objective:    Physical Exam   Constitutional: She is oriented to person, place, and time  She appears well-developed and well-nourished  In pain but not acutely distressed   HENT:   Mouth/Throat: No oropharyngeal exudate  Eyes: No scleral icterus  Neck: No JVD present  No thyromegaly present  Cardiovascular: Normal rate, regular rhythm and normal heart sounds  Pulmonary/Chest: Effort normal and breath sounds normal  No respiratory distress  She has no wheezes  She has no rales  Abdominal: Soft  Bowel sounds are normal  There is tenderness  Normoactive bowel sounds soft and nondistended  She does have some tenderness which is relatively exquisite in her right pelvic gutter  This is inferior to McBurney's point  She has no peritoneal findings  She has no mass organomegaly  Musculoskeletal: She exhibits no edema or tenderness  Neurological: She is alert and oriented to person, place, and time  Skin: No rash noted     Psychiatric:   She is uncomfortable and somewhat anxious

## 2019-03-22 ENCOUNTER — VBI (OUTPATIENT)
Dept: ADMINISTRATIVE | Facility: OTHER | Age: 34
End: 2019-03-22

## 2019-03-22 NOTE — TELEPHONE ENCOUNTER
Eloy Byrd    ED Visit Information     Ed visit date: 3/18/2019  Diagnosis Description:  Right lower quadrant abdominal pain; Uterine fibroid; Pelvic pain  In Network? Yes [address] [facility]  Discharge status: Home  Discharged with meds ? No  Number of ED visits to date: 1  ED Severity:n/a     Outreach Information    Outreach successful: No 1  Date letter mailed:Pending  Date Finalized:Pending    Care Coordination    Follow up appointment with pcp: no No ED f/u appt scheuled (3/19 appt canceled)   Transportation issues ? NA    [facility] [employer] Outreach    Outreach type:  7 Day Outreach  Emergent necessity warranted by diagnosis:  No  [facility] PCP:  Yes  Transportation:  Self Transport  03/22/2019 12:53 PM Phone [employer] Melba Overton (Self) [phone] (M)  Left Message - requesting a call back  Unable to reach patient regarding recent E visit on 3/18 for  Right lower quadrant abdominal pain; Uterine fibroid; Pelvic pain  Patient was discharged without medication and advised to follow up with both ObGyn and PCP  2nd attempt to reach patient will be made on 3/25

## 2019-07-15 DIAGNOSIS — F41.9 ANXIETY: ICD-10-CM

## 2019-08-23 DIAGNOSIS — F41.9 ANXIETY: ICD-10-CM

## 2019-10-08 DIAGNOSIS — F41.9 ANXIETY: ICD-10-CM

## 2019-10-15 DIAGNOSIS — F41.9 ANXIETY: ICD-10-CM

## 2019-11-14 ENCOUNTER — OFFICE VISIT (OUTPATIENT)
Dept: FAMILY MEDICINE CLINIC | Facility: CLINIC | Age: 34
End: 2019-11-14
Payer: COMMERCIAL

## 2019-11-14 VITALS
TEMPERATURE: 96.4 F | HEIGHT: 66 IN | WEIGHT: 163 LBS | BODY MASS INDEX: 26.2 KG/M2 | OXYGEN SATURATION: 96 % | HEART RATE: 60 BPM

## 2019-11-14 DIAGNOSIS — F41.9 ANXIETY: ICD-10-CM

## 2019-11-14 DIAGNOSIS — F32.2 MAJOR DEPRESSIVE DISORDER, SINGLE EPISODE, SEVERE WITHOUT PSYCHOTIC FEATURES (HCC): Primary | ICD-10-CM

## 2019-11-14 DIAGNOSIS — Z23 ENCOUNTER FOR IMMUNIZATION: ICD-10-CM

## 2019-11-14 PROCEDURE — 99214 OFFICE O/P EST MOD 30 MIN: CPT | Performed by: FAMILY MEDICINE

## 2019-11-14 PROCEDURE — 1036F TOBACCO NON-USER: CPT | Performed by: FAMILY MEDICINE

## 2019-11-14 NOTE — ASSESSMENT & PLAN NOTE
The patient has worsening anxiety  Does not have significant symptoms of depression presently  We are going to try to increase her sertraline to 75 mg daily  She is asked to call in 10-14 days with report of the effectiveness of this intervention  She is in agreement with this plan

## 2019-11-14 NOTE — PROGRESS NOTES
BMI Counseling: Body mass index is 26 31 kg/m²  The BMI is above normal  Nutrition recommendations include decreasing portion sizes, encouraging healthy choices of fruits and vegetables, consuming healthier snacks and moderation in carbohydrate intake  Exercise recommendations include moderate physical activity 150 minutes/week  No pharmacotherapy was ordered  Assessment/Plan:  Generalized anxiety disorder  The patient has worsening anxiety  Does not have significant symptoms of depression presently  We are going to try to increase her sertraline to 75 mg daily  She is asked to call in 10-14 days with report of the effectiveness of this intervention  She is in agreement with this plan  Major depressive disorder, single episode, severe without psychotic features [facility] )  Currently her depression appears to be in remission  Diagnoses and all orders for this visit:    Major depressive disorder, single episode, severe without psychotic features ([facility])    Anxiety  -     sertraline (ZOLOFT) 50 mg tablet; Take 1 5 tablets (75 mg total) by mouth daily          Subjective:   Chief Complaint   Patient presents with    Depression     here for med check  pt declined the flu shot   Overall I feel OK  I feel like I am having more frequent anxiety attacks  Exercises and getting good sleep  Triggered by discussions or cancer / death  Sleeps well with melatonin  Interest good, energy good, concentration ok, appetite ok, No suicidal  No GI c/o  Tried an alprazolam        Patient ID: Martin Arnold is a 28 y o  female  HPI  The patient is a 25-year-old female who presents today for follow-up of anxiety depression  She states that over the past 2 months or so she has been having more frequent anxiety attacks  Overall she states she feels pretty well  She has been exercising more and trying to get adequate sleep but continues to have periods of anxiety which are triggered by discussions of cancer or death    She sleeps well when she takes melatonin  Overall her interest is good as well as her energy in her ability to concentrate  Her appetite is fine and she has had no suicidal thoughts  She has no GI complaints  She did try alprazolam and her mother gave her prior to a drive and that seemed to help  Driving distance is seem to exacerbate her anxiety  The following portions of the patient's history were reviewed and updated as appropriate: allergies, current medications, past medical history, past social history, past surgical history and problem list     Review of Systems   Constitution: Negative for chills, decreased appetite, fever, weight gain and weight loss  Gastrointestinal: Negative  Genitourinary: Negative  Neurological: Negative for headaches  Psychiatric/Behavioral: Negative for depression and suicidal ideas  The patient has insomnia and is nervous/anxious  Objective:    Physical Exam   Constitutional: She is oriented to person, place, and time  She appears well-developed and well-nourished  Neck: Neck supple  No thyromegaly present  Cardiovascular: Normal rate, regular rhythm and normal heart sounds  Pulmonary/Chest: Effort normal and breath sounds normal  No respiratory distress  She has no wheezes  She has no rales  Musculoskeletal: She exhibits no edema  Neurological: She is alert and oriented to person, place, and time  Psychiatric: She has a normal mood and affect   Thought content normal

## 2020-05-20 ENCOUNTER — TELEMEDICINE (OUTPATIENT)
Dept: FAMILY MEDICINE CLINIC | Facility: CLINIC | Age: 35
End: 2020-05-20
Payer: COMMERCIAL

## 2020-05-20 DIAGNOSIS — F43.22 ADJUSTMENT DISORDER WITH ANXIOUS MOOD: ICD-10-CM

## 2020-05-20 DIAGNOSIS — F32.2 MAJOR DEPRESSIVE DISORDER, SINGLE EPISODE, SEVERE WITHOUT PSYCHOTIC FEATURES (HCC): ICD-10-CM

## 2020-05-20 DIAGNOSIS — F41.1 GENERALIZED ANXIETY DISORDER: Primary | ICD-10-CM

## 2020-05-20 DIAGNOSIS — F41.9 ANXIETY: ICD-10-CM

## 2020-05-20 PROCEDURE — 99213 OFFICE O/P EST LOW 20 MIN: CPT | Performed by: FAMILY MEDICINE

## 2020-05-20 RX ORDER — LORAZEPAM 0.5 MG/1
0.5 TABLET ORAL
Qty: 30 TABLET | Refills: 1 | Status: SHIPPED | OUTPATIENT
Start: 2020-05-20 | End: 2021-01-05 | Stop reason: SDUPTHER

## 2020-08-14 ENCOUNTER — TELEPHONE (OUTPATIENT)
Dept: OBGYN CLINIC | Facility: CLINIC | Age: 35
End: 2020-08-14

## 2020-09-14 ENCOUNTER — ANNUAL EXAM (OUTPATIENT)
Dept: OBGYN CLINIC | Facility: CLINIC | Age: 35
End: 2020-09-14
Payer: COMMERCIAL

## 2020-09-14 VITALS
DIASTOLIC BLOOD PRESSURE: 76 MMHG | SYSTOLIC BLOOD PRESSURE: 120 MMHG | BODY MASS INDEX: 27.68 KG/M2 | HEIGHT: 66 IN | WEIGHT: 172.2 LBS

## 2020-09-14 DIAGNOSIS — Z01.419 ENCNTR FOR GYN EXAM (GENERAL) (ROUTINE) W/O ABN FINDINGS: Primary | ICD-10-CM

## 2020-09-14 PROCEDURE — 87624 HPV HI-RISK TYP POOLED RSLT: CPT | Performed by: OBSTETRICS & GYNECOLOGY

## 2020-09-14 PROCEDURE — S0610 ANNUAL GYNECOLOGICAL EXAMINA: HCPCS | Performed by: OBSTETRICS & GYNECOLOGY

## 2020-09-14 PROCEDURE — G0145 SCR C/V CYTO,THINLAYER,RESCR: HCPCS | Performed by: OBSTETRICS & GYNECOLOGY

## 2020-09-14 NOTE — PROGRESS NOTES
Assessment/Plan:  24-year-old female presents for annual visit  Normal gyn exam  Pap and HPV testing performed today  Patient lives with her 11year-old, feels safe in her home environment that domestic issues  She is in a new relationship  Patient desires IUD for birth control, will order IUD in place with next menses  Use of condoms and safe sex discussed with patient  Diet and exercise discussed with patient  Screening mammogram starting at age 36  Follow-up in 1 year or as needed     Diagnoses and all orders for this visit:    Encntr for gyn exam (general) (routine) w/o abn findings  -     Liquid-based pap, screening          Subjective:      Patient ID: Ralph Snellen is a 27 y o  female  24-year-old  female last menstrual period 2020 presents for annual visit  She offers no gyn complaints at this time  The following portions of the patient's history were reviewed and updated as appropriate: allergies, current medications, past family history, past medical history, past social history, past surgical history and problem list     Review of Systems   Constitutional: Negative for appetite change, fatigue, fever and unexpected weight change  Respiratory: Negative for cough, shortness of breath and wheezing  Cardiovascular: Negative for leg swelling  Gastrointestinal: Negative for abdominal distention, abdominal pain, anal bleeding, blood in stool, constipation and diarrhea  Genitourinary: Negative for difficulty urinating, dysuria, flank pain, frequency, genital sores, hematuria, menstrual problem, pelvic pain, urgency, vaginal bleeding, vaginal discharge and vaginal pain  Dyspareunia: Not sexually active at this time  Musculoskeletal: Negative for arthralgias and myalgias  Neurological: Negative for headaches  Psychiatric/Behavioral: Negative for sleep disturbance  The patient is not nervous/anxious            Objective:      /76 (BP Location: Left arm, Patient Position: Sitting, Cuff Size: Standard)   Ht 5' 6" (1 676 m)   Wt 78 1 kg (172 lb 3 2 oz)   LMP 08/31/2020   BMI 27 79 kg/m²          Physical Exam  Vitals signs and nursing note reviewed  Exam conducted with a chaperone present  Constitutional:       Appearance: Normal appearance  She is well-developed  HENT:      Head: Normocephalic and atraumatic  Neck:      Musculoskeletal: Neck supple  Thyroid: No thyromegaly  Cardiovascular:      Rate and Rhythm: Normal rate and regular rhythm  Heart sounds: Normal heart sounds  No murmur  Pulmonary:      Effort: Pulmonary effort is normal       Breath sounds: Normal breath sounds  Chest:      Breasts: Breasts are symmetrical          Right: Normal  No inverted nipple, mass, nipple discharge, skin change or tenderness  Left: Normal  No inverted nipple, mass, nipple discharge, skin change or tenderness  Abdominal:      General: Bowel sounds are normal  There is no distension  Palpations: Abdomen is soft  Tenderness: There is no abdominal tenderness  Hernia: There is no hernia in the left inguinal area or right inguinal area  Genitourinary:     General: Normal vulva  Exam position: Lithotomy position  Labia:         Right: No rash or tenderness  Left: No rash or tenderness  Vagina: Normal  No vaginal discharge  Cervix: Normal       Uterus: Normal        Adnexa: Right adnexa normal and left adnexa normal         Right: No mass, tenderness or fullness  Left: No mass, tenderness or fullness  Rectum: Normal    Lymphadenopathy:      Cervical: No cervical adenopathy  Upper Body:      Right upper body: No axillary adenopathy  Left upper body: No axillary adenopathy  Lower Body: No right inguinal adenopathy  No left inguinal adenopathy  Skin:     General: Skin is warm and dry  Neurological:      Mental Status: She is alert and oriented to person, place, and time     Psychiatric: Mood and Affect: Mood normal          Behavior: Behavior normal          Thought Content:  Thought content normal          Judgment: Judgment normal

## 2020-09-17 LAB
HPV HR 12 DNA CVX QL NAA+PROBE: NEGATIVE
HPV16 DNA CVX QL NAA+PROBE: NEGATIVE
HPV18 DNA CVX QL NAA+PROBE: NEGATIVE
LAB AP GYN PRIMARY INTERPRETATION: NORMAL
Lab: NORMAL

## 2020-11-02 ENCOUNTER — TELEPHONE (OUTPATIENT)
Dept: OBGYN CLINIC | Facility: CLINIC | Age: 35
End: 2020-11-02

## 2020-11-02 ENCOUNTER — OFFICE VISIT (OUTPATIENT)
Dept: OBGYN CLINIC | Facility: CLINIC | Age: 35
End: 2020-11-02
Payer: COMMERCIAL

## 2020-11-02 VITALS
TEMPERATURE: 98.2 F | SYSTOLIC BLOOD PRESSURE: 142 MMHG | HEIGHT: 66 IN | BODY MASS INDEX: 29.09 KG/M2 | DIASTOLIC BLOOD PRESSURE: 70 MMHG | WEIGHT: 181 LBS

## 2020-11-02 DIAGNOSIS — Z11.3 SCREEN FOR SEXUALLY TRANSMITTED DISEASES: ICD-10-CM

## 2020-11-02 DIAGNOSIS — Z97.5 CONTRACEPTION, DEVICE INTRAUTERINE: Primary | ICD-10-CM

## 2020-11-02 LAB — SL AMB POCT URINE HCG: NEGATIVE

## 2020-11-02 PROCEDURE — 87491 CHLMYD TRACH DNA AMP PROBE: CPT | Performed by: OBSTETRICS & GYNECOLOGY

## 2020-11-02 PROCEDURE — 58300 INSERT INTRAUTERINE DEVICE: CPT | Performed by: OBSTETRICS & GYNECOLOGY

## 2020-11-02 PROCEDURE — 81025 URINE PREGNANCY TEST: CPT | Performed by: OBSTETRICS & GYNECOLOGY

## 2020-11-02 PROCEDURE — 87591 N.GONORRHOEAE DNA AMP PROB: CPT | Performed by: OBSTETRICS & GYNECOLOGY

## 2020-11-03 LAB
C TRACH DNA SPEC QL NAA+PROBE: NEGATIVE
N GONORRHOEA DNA SPEC QL NAA+PROBE: NEGATIVE

## 2021-01-05 DIAGNOSIS — F41.1 GENERALIZED ANXIETY DISORDER: ICD-10-CM

## 2021-01-05 DIAGNOSIS — F43.22 ADJUSTMENT DISORDER WITH ANXIOUS MOOD: ICD-10-CM

## 2021-01-05 RX ORDER — LORAZEPAM 0.5 MG/1
0.5 TABLET ORAL
Qty: 30 TABLET | Refills: 0 | Status: SHIPPED | OUTPATIENT
Start: 2021-01-05 | End: 2021-11-22 | Stop reason: SDUPTHER

## 2021-01-15 ENCOUNTER — TELEMEDICINE (OUTPATIENT)
Dept: FAMILY MEDICINE CLINIC | Facility: CLINIC | Age: 36
End: 2021-01-15
Payer: COMMERCIAL

## 2021-01-15 VITALS
SYSTOLIC BLOOD PRESSURE: 120 MMHG | HEIGHT: 66 IN | BODY MASS INDEX: 28.12 KG/M2 | TEMPERATURE: 97.5 F | WEIGHT: 175 LBS | DIASTOLIC BLOOD PRESSURE: 70 MMHG

## 2021-01-15 DIAGNOSIS — F41.1 GENERALIZED ANXIETY DISORDER: Primary | ICD-10-CM

## 2021-01-15 PROCEDURE — 99213 OFFICE O/P EST LOW 20 MIN: CPT | Performed by: FAMILY MEDICINE

## 2021-01-15 PROCEDURE — 1036F TOBACCO NON-USER: CPT | Performed by: FAMILY MEDICINE

## 2021-01-15 PROCEDURE — 3008F BODY MASS INDEX DOCD: CPT | Performed by: FAMILY MEDICINE

## 2021-01-15 PROCEDURE — 3725F SCREEN DEPRESSION PERFORMED: CPT | Performed by: FAMILY MEDICINE

## 2021-01-15 NOTE — ASSESSMENT & PLAN NOTE
The patient is much improved since her last visit  She has no remaining vegetative symptoms of depression  She continues have some intermittent anxiety, especially related to travel to her house at the [facility]  She discontinued her sertraline in November  She continues to use infrequent Ativan  We encouraged her to begin taking vitamin-D 2000 units daily  See her back in follow-up in 6 months or sooner as needed  She agrees

## 2021-01-15 NOTE — PROGRESS NOTES
Virtual Regular Visit      Assessment/Plan:    Problem List Items Addressed This Visit        Other    Generalized anxiety disorder - Primary     The patient is much improved since her last visit  She has no remaining vegetative symptoms of depression  She continues have some intermittent anxiety, especially related to travel to her house at the [facility]  She discontinued her sertraline in November  She continues to use infrequent Ativan  We encouraged her to begin taking vitamin-D 2000 units daily  See her back in follow-up in 6 months or sooner as needed  She agrees  Reason for visit is   Chief Complaint   Patient presents with    Anxiety     pt having a virtual med check    Virtual Regular Visit        Encounter provider Manuelito Mcclellan MD    Provider located at [address] [id number]      Recent Visits  No visits were found meeting these conditions  Showing recent visits within past 7 days and meeting all other requirements     Today's Visits  Date Type Provider Dept   01/15/21 Telemedicine Manuelito Mcclellan MD [facility]   Showing today's visits and meeting all other requirements     Future Appointments  No visits were found meeting these conditions  Showing future appointments within next 150 days and meeting all other requirements        The patient was identified by name and date of birth  [facility] was informed that this is a telemedicine visit and that the visit is being conducted through [facility] and patient was informed that this is a secure, HIPAA-compliant platform  She agrees to proceed     My office door was closed  No one else was in the room  She acknowledged consent and understanding of privacy and security of the video platform  The patient has agreed to participate and understands they can discontinue the visit at any time  Patient is aware this is a billable service  Subjective  Joelle Cobos is a 38 y o  female        HPI   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 routine follow-up of generalized anxiety disorder  She also suffered from depression and posttraumatic stress disorder related to the accidental death of her   She states that she has been doing well  She has begun to run again  Her anxiety level is less and she actually discontinued sertraline prior to Thanksgiving  She states that she has some difficulty sleeping and takes melatonin 5 mg nightly which is affective  She has no side effects from medication  She states that her interest level is high again and her energy level is good  She can concentrate well and her appetite is fine  She has no suicidal ideation  She uses lorazepam very infrequently maybe 2 to 3 times a month for episodes of anxiety related to travel  Past Medical History:   Diagnosis Date    Grief reaction 8/10/2016    Herpes simplex type 1 infection     last assessed 8/8/13, resolved 4/20/17        Past Surgical History:   Procedure Laterality Date    WISDOM TOOTH EXTRACTION         Current Outpatient Medications   Medication Sig Dispense Refill    LORazepam (ATIVAN) 0 5 mg tablet Take 1 tablet (0 5 mg total) by mouth daily at bedtime as needed for anxiety 30 tablet 0    sertraline (ZOLOFT) 50 mg tablet Take 1 5 tablets (75 mg total) by mouth daily (Patient not taking: Reported on 1/15/2021) 135 tablet 1     No current facility-administered medications for this visit  Allergies   Allergen Reactions    Moxifloxacin Hives     Reaction Date: [id number];        Review of Systems   Psychiatric/Behavioral: Positive for sleep disturbance  The patient is nervous/anxious  All other systems reviewed and are negative        Video Exam    Vitals:    01/15/21 1446   BP: 120/70   Temp: 97 5 °F (36 4 °C)   Weight: 79 4 kg (175 lb)   Height: 5' 6" (1 676 m)       Physical Exam  Vitals signs and nursing note reviewed  Constitutional:       Appearance: She is not ill-appearing  Pulmonary:      Breath sounds: Normal breath sounds  Neurological:      Mental Status: She is alert and oriented to person, place, and time  Psychiatric:         Mood and Affect: Mood normal          Behavior: Behavior normal          Thought Content: Thought content normal          Judgment: Judgment normal           I spent 10 minutes directly with the patient during this visit      12 Anthony White acknowledges that she has consented to an online visit or consultation  She understands that the online visit is based solely on information provided by her, and that, in the absence of a face-to-face physical evaluation by the physician, the diagnosis she receives is both limited and provisional in terms of accuracy and completeness  This is not intended to replace a full medical face-to-face evaluation by the physician  Lo Simon understands and accepts these terms

## 2021-03-03 ENCOUNTER — OFFICE VISIT (OUTPATIENT)
Dept: OBGYN CLINIC | Facility: CLINIC | Age: 36
End: 2021-03-03
Payer: COMMERCIAL

## 2021-03-03 VITALS
HEIGHT: 66 IN | TEMPERATURE: 97.2 F | SYSTOLIC BLOOD PRESSURE: 118 MMHG | DIASTOLIC BLOOD PRESSURE: 72 MMHG | BODY MASS INDEX: 27 KG/M2 | WEIGHT: 168 LBS

## 2021-03-03 DIAGNOSIS — Z30.431 IUD CHECK UP: Primary | ICD-10-CM

## 2021-03-03 PROCEDURE — 3008F BODY MASS INDEX DOCD: CPT | Performed by: OBSTETRICS & GYNECOLOGY

## 2021-03-03 PROCEDURE — 1036F TOBACCO NON-USER: CPT | Performed by: OBSTETRICS & GYNECOLOGY

## 2021-03-03 PROCEDURE — 99213 OFFICE O/P EST LOW 20 MIN: CPT | Performed by: OBSTETRICS & GYNECOLOGY

## 2021-03-03 NOTE — PROGRESS NOTES
Assessment/Plan:     Diagnoses and all orders for this visit:    IUD check up    Other orders  -     levonorgestrel (MIRENA) 20 MCG/24HR IUD; 1 each by Intrauterine route once          58-year-old female   IUD Shay Mendiola  Presents to the office for IUD check   Plan   IUD string seen   Return to office for annual exam    Subjective:      Patient ID: Evelia Tena is a 38 y o  female  HPI   58-year-old female presents to the office today for IUD check denies any vaginal bleeding denies any pelvic pain denies any vaginal itching or odor or discharge satisfied with the IUD        The following portions of the patient's history were reviewed and updated as appropriate: allergies, current medications, past family history, past medical history, past social history, past surgical history and problem list     Review of Systems      Objective:      /72 (BP Location: Left arm, Patient Position: Sitting, Cuff Size: Standard)   Temp (!) 97 2 °F (36 2 °C)   Ht 5' 6" (1 676 m)   Wt 76 2 kg (168 lb)   LMP 01/28/2021   BMI 27 12 kg/m²          Physical Exam  Constitutional:       Appearance: She is well-developed  Abdominal:      General: There is no distension  Palpations: Abdomen is soft  Tenderness: There is no abdominal tenderness  Genitourinary:     Labia:         Right: No rash, tenderness or lesion  Left: No rash, tenderness or lesion  Vagina: No signs of injury  No vaginal discharge, erythema or tenderness  Cervix: No cervical motion tenderness, discharge or friability  Adnexa:         Right: No mass, tenderness or fullness  Left: No mass, tenderness or fullness  Neurological:      Mental Status: She is alert and oriented to person, place, and time     Psychiatric:         Behavior: Behavior normal

## 2021-04-09 DIAGNOSIS — Z23 ENCOUNTER FOR IMMUNIZATION: ICD-10-CM

## 2021-10-14 ENCOUNTER — ANNUAL EXAM (OUTPATIENT)
Dept: OBGYN CLINIC | Facility: CLINIC | Age: 36
End: 2021-10-14
Payer: COMMERCIAL

## 2021-10-14 ENCOUNTER — OFFICE VISIT (OUTPATIENT)
Dept: DERMATOLOGY | Facility: CLINIC | Age: 36
End: 2021-10-14
Payer: COMMERCIAL

## 2021-10-14 VITALS — TEMPERATURE: 97 F | BODY MASS INDEX: 25.39 KG/M2 | HEIGHT: 66 IN | WEIGHT: 158 LBS

## 2021-10-14 VITALS
DIASTOLIC BLOOD PRESSURE: 80 MMHG | WEIGHT: 158.6 LBS | HEIGHT: 66 IN | BODY MASS INDEX: 25.49 KG/M2 | SYSTOLIC BLOOD PRESSURE: 120 MMHG

## 2021-10-14 DIAGNOSIS — Z01.419 ENCOUNTER FOR GYNECOLOGICAL EXAMINATION WITHOUT ABNORMAL FINDING: Primary | ICD-10-CM

## 2021-10-14 DIAGNOSIS — L71.0 PERIORIFICIAL DERMATITIS: Primary | ICD-10-CM

## 2021-10-14 PROBLEM — N64.4 BREAST PAIN: Status: RESOLVED | Noted: 2017-05-15 | Resolved: 2021-10-14

## 2021-10-14 PROBLEM — R10.2 PELVIC PAIN IN FEMALE: Status: RESOLVED | Noted: 2019-03-18 | Resolved: 2021-10-14

## 2021-10-14 PROCEDURE — G0145 SCR C/V CYTO,THINLAYER,RESCR: HCPCS | Performed by: PATHOLOGY

## 2021-10-14 PROCEDURE — 99204 OFFICE O/P NEW MOD 45 MIN: CPT | Performed by: DERMATOLOGY

## 2021-10-14 PROCEDURE — 1036F TOBACCO NON-USER: CPT | Performed by: DERMATOLOGY

## 2021-10-14 PROCEDURE — G0476 HPV COMBO ASSAY CA SCREEN: HCPCS | Performed by: PHYSICIAN ASSISTANT

## 2021-10-14 PROCEDURE — 3008F BODY MASS INDEX DOCD: CPT | Performed by: DERMATOLOGY

## 2021-10-14 PROCEDURE — G0124 SCREEN C/V THIN LAYER BY MD: HCPCS | Performed by: PATHOLOGY

## 2021-10-14 PROCEDURE — S0612 ANNUAL GYNECOLOGICAL EXAMINA: HCPCS | Performed by: PHYSICIAN ASSISTANT

## 2021-10-14 RX ORDER — DOXYCYCLINE HYCLATE 100 MG/1
CAPSULE ORAL
Qty: 60 CAPSULE | Refills: 0 | Status: SHIPPED | OUTPATIENT
Start: 2021-10-14 | End: 2021-11-14

## 2021-10-20 LAB
HPV HR 12 DNA CVX QL NAA+PROBE: NEGATIVE
HPV16 DNA CVX QL NAA+PROBE: NEGATIVE
HPV18 DNA CVX QL NAA+PROBE: NEGATIVE

## 2021-10-25 LAB
LAB AP GYN PRIMARY INTERPRETATION: NORMAL
Lab: NORMAL
PATH INTERP SPEC-IMP: NORMAL

## 2021-11-22 DIAGNOSIS — F43.22 ADJUSTMENT DISORDER WITH ANXIOUS MOOD: ICD-10-CM

## 2021-11-22 DIAGNOSIS — F41.1 GENERALIZED ANXIETY DISORDER: ICD-10-CM

## 2021-11-22 RX ORDER — LORAZEPAM 0.5 MG/1
0.5 TABLET ORAL
Qty: 30 TABLET | Refills: 0 | Status: SHIPPED | OUTPATIENT
Start: 2021-11-22 | End: 2022-05-17 | Stop reason: SDUPTHER

## 2021-12-13 ENCOUNTER — TELEMEDICINE (OUTPATIENT)
Dept: FAMILY MEDICINE CLINIC | Facility: CLINIC | Age: 36
End: 2021-12-13
Payer: COMMERCIAL

## 2021-12-13 VITALS
SYSTOLIC BLOOD PRESSURE: 116 MMHG | WEIGHT: 161 LBS | BODY MASS INDEX: 25.88 KG/M2 | TEMPERATURE: 97.5 F | DIASTOLIC BLOOD PRESSURE: 74 MMHG | HEIGHT: 66 IN

## 2021-12-13 DIAGNOSIS — F41.1 GENERALIZED ANXIETY DISORDER: Primary | ICD-10-CM

## 2021-12-13 PROCEDURE — 99213 OFFICE O/P EST LOW 20 MIN: CPT | Performed by: FAMILY MEDICINE

## 2021-12-13 PROCEDURE — 3725F SCREEN DEPRESSION PERFORMED: CPT | Performed by: FAMILY MEDICINE

## 2021-12-13 PROCEDURE — 3008F BODY MASS INDEX DOCD: CPT | Performed by: FAMILY MEDICINE

## 2021-12-13 PROCEDURE — 1036F TOBACCO NON-USER: CPT | Performed by: FAMILY MEDICINE

## 2022-03-17 ENCOUNTER — OFFICE VISIT (OUTPATIENT)
Dept: OBGYN CLINIC | Facility: CLINIC | Age: 37
End: 2022-03-17
Payer: COMMERCIAL

## 2022-03-17 ENCOUNTER — HOSPITAL ENCOUNTER (OUTPATIENT)
Dept: RADIOLOGY | Facility: HOSPITAL | Age: 37
Discharge: HOME | End: 2022-03-17
Payer: COMMERCIAL

## 2022-03-17 VITALS — HEIGHT: 66 IN | HEART RATE: 79 BPM | WEIGHT: 163.4 LBS | OXYGEN SATURATION: 99 % | BODY MASS INDEX: 26.26 KG/M2

## 2022-03-17 DIAGNOSIS — M79.672 PAIN IN LEFT FOOT: Primary | ICD-10-CM

## 2022-03-17 DIAGNOSIS — M79.672 PAIN IN LEFT FOOT: ICD-10-CM
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
## 2022-03-17 PROCEDURE — 73630 X-RAY EXAM OF FOOT: CPT

## 2022-03-17 PROCEDURE — 99203 OFFICE O/P NEW LOW 30 MIN: CPT | Performed by: PHYSICIAN ASSISTANT

## 2022-03-17 NOTE — PROGRESS NOTES
Assessment/Plan   Diagnoses and all orders for this visit:    Pain in left foot      Overuse injury  - MRI foot - attn distal half of 5th MT  - Rest, Ice, NSAIDs as needed  - Follow up with Dr Annia Cordero          Subjective   Patient ID: Art Morris is a 39 y o  female  Vitals:    03/17/22 1046   Pulse: 79   SpO2: 99%     37yo female comes in for an evaluation of her left foot  She is a runner who has been having pain for about a year  No specific fall or injury  For the last month, she has been resting from running, but her pain has not improved  The pain is located over the distal half of the 5th MT  The pain is sharp in character, mild in severity, pain does not radiate and is not associated with numbness  The following portions of the patient's history were reviewed and updated as appropriate: allergies, current medications, past family history, past medical history, past social history, past surgical history and problem list     Review of Systems  Ortho Exam  Past Medical History:   Diagnosis Date    Grief reaction 8/10/2016    Herpes simplex type 1 infection     last assessed 8/8/13, resolved 4/20/17      Past Surgical History:   Procedure Laterality Date    WISDOM TOOTH EXTRACTION       Family History   Problem Relation Age of Onset    Diabetes Father     Breast cancer Maternal Aunt      Social History     Occupational History    Occupation: Teacher    Tobacco Use    Smoking status: Never Smoker    Smokeless tobacco: Never Used   Vaping Use    Vaping Use: Never used   Substance and Sexual Activity    Alcohol use: Yes     Comment: social    Drug use: No    Sexual activity: Yes     Partners: Male     Birth control/protection: I U D  Review of Systems   Constitutional: Negative  HENT: Negative  Eyes: Negative  Respiratory: Negative  Cardiovascular: Negative  Gastrointestinal: Negative  Endocrine: Negative  Genitourinary: Negative      Musculoskeletal: As below    Allergic/Immunologic: Negative  Neurological: Negative  Hematological: Negative  Psychiatric/Behavioral: Negative  Objective   Physical Exam        I have personally reviewed pertinent films in PACS and my interpretation is no acute displaced fracture on xray  · Constitutional: Awake, Alert, Oriented  · Eyes: EOMI  · Psych: Mood and affect appropriate  · Heart: regular rate   · Lungs: No audible wheezing  · Abdomen: No guarding  · Lymph: no lymphedema             left foot:  - Appearance   No swelling, discoloration, deformity, or ecchymosis  - Palpation   + tenderness of the distal half of the 5th MT   Non-tender MTP joint  Non-tender 4th MT  Otherwise, no tenderness about the foot or ankle   - ROM   Full, pain-free, active ROM of the foot and small toe    - Motor   normal 5/5 in all planes  - NVI distally

## 2022-03-24 ENCOUNTER — HOSPITAL ENCOUNTER (OUTPATIENT)
Dept: RADIOLOGY | Facility: HOSPITAL | Age: 37
Discharge: HOME | End: 2022-03-24
Payer: COMMERCIAL
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
## 2022-03-24 DIAGNOSIS — M79.672 PAIN IN LEFT FOOT: ICD-10-CM

## 2022-03-24 PROCEDURE — 73718 MRI LOWER EXTREMITY W/O DYE: CPT

## 2022-03-29 ENCOUNTER — OFFICE VISIT (OUTPATIENT)
Dept: OBGYN CLINIC | Facility: CLINIC | Age: 37
End: 2022-03-29
Payer: COMMERCIAL

## 2022-03-29 VITALS — BODY MASS INDEX: 26.2 KG/M2 | WEIGHT: 163 LBS | HEIGHT: 66 IN

## 2022-03-29 DIAGNOSIS — M79.672 PAIN IN LEFT FOOT: Primary | ICD-10-CM

## 2022-03-29 DIAGNOSIS — M84.375D: ICD-10-CM

## 2022-03-29 PROCEDURE — 3008F BODY MASS INDEX DOCD: CPT | Performed by: ORTHOPAEDIC SURGERY

## 2022-03-29 PROCEDURE — 99213 OFFICE O/P EST LOW 20 MIN: CPT | Performed by: ORTHOPAEDIC SURGERY

## 2022-03-29 PROCEDURE — 1036F TOBACCO NON-USER: CPT | Performed by: ORTHOPAEDIC SURGERY

## 2022-03-29 NOTE — PROGRESS NOTES
MONIKA Ridley  Attending, Orthopaedic Surgery  [facility] [address] [facility]      ORTHOPAEDIC FOOT AND ANKLE CLINIC VISIT     Assessment:     Encounter Diagnoses   Name Primary?  Pain in left foot Yes    Stress reaction of left foot with routine healing             Plan:   · The patient verbalized understanding of exam findings and treatment plan  We engaged in the shared decision-making process and treatment options were discussed at length with the patient  Surgical and conservative management discussed today along with risks and benefits  · Leola Ruiz has lateral foot pain with a normal MRI, she has no stress fracture or reaction  · She may have some pressure from a narrow shoe across the forefoot  She would benefit from a wider toe box shoe  · She will continue to ice the foot and rest as needed, slowly progress with activity as tolerated  Return if symptoms worsen or fail to improve  History of Present Illness:   Chief Complaint:   Chief Complaint   Patient presents with   617 Mulu is a 39 y o  female who is being seen for left foot pain  She is a runner and has had pain in the lateral foot and ankle for 1 month  She did decrease running which has improved her pain somewhat  She also got an OTC arch wrap  Pain is localized at lateral ankle with minimal radiating and described as sharp and severe  Patient denies numbness, tingling or radicular pain  Denies history of neuropathy  Patient does not smoke, does not have diabetes and does not take blood thinners  Patient denies family history of anesthesia complications and has not had any complications with anesthesia       Pain/symptom timing:  Worse during the day when active  Pain/symptom context:  Worse with activites and work  Pain/symptom modifying factors:  Rest makes better, activities make worse  Pain/symptom associated signs/symptoms: none    Prior treatment   · NSAIDsNo · Injections No   · Bracing/Orthotics No    · Physical Therapy No     Orthopedic Surgical History:   None    Past Medical, Surgical and Social History:  Past Medical History:  has a past medical history of Grief reaction (8/10/2016) and Herpes simplex type 1 infection  Problem List: does not have any pertinent problems on file  Past Surgical History:  has a past surgical history that includes [place] tooth extraction  Family History: family history includes Breast cancer in her maternal aunt; Diabetes in her father  Social History:  reports that she has never smoked  She has never used smokeless tobacco  She reports current alcohol use  She reports that she does not use drugs  Current Medications: has a current medication list which includes the following prescription(s): levonorgestrel, lorazepam, and metronidazole  Allergies: is allergic to moxifloxacin  Review of Systems:  General- denies fever/chills  HEENT- denies hearing loss or sore throat  Eyes- denies eye pain or visual disturbances, denies red eyes  Respiratory- denies cough or SOB  Cardio- denies chest pain or palpitations  GI- denies abdominal pain  Endocrine- denies urinary frequency  Urinary- denies pain with urination  Musculoskeletal- Negative except noted above  Skin- denies rashes or wounds  Neurological- denies dizziness or headache  Psychiatric- denies anxiety or difficulty concentrating    Physical Exam:   Ht 5' 6" (1 676 m)   Wt 73 9 kg (163 lb)   BMI 26 31 kg/m²   General/Constitutional: No apparent distress: well-nourished and well developed    Eyes: normal ocular motion  Cardio: RRR, Normal S1S2, No m/r/g  Lymphatic: No appreciable lymphadenopathy  Respiratory: Non-labored breathing, CTA b/l no w/c/r  Vascular: No edema, swelling or tenderness, except as noted in detailed exam   Integumentary: No impressive skin lesions present, except as noted in detailed exam   Neuro: No ataxia or tremors noted  Psych: Normal mood and affect, oriented to person, place and time  Appropriate affect  Musculoskeletal: Normal, except as noted in detailed exam and in HPI  Examination    Left    Gait Normal   Musculoskeletal Tender to palpation at 5th MT    Skin Normal       Nails Normal    Range of Motion  20 degrees dorsiflexion, 40 degrees plantarflexion  Subtalar motion: normal    Stability Stable    Muscle Strength 5/5 tibialis anterior  5/5 gastrocnemius-soleus  4/5 posterior tibialis  4/5 peroneal/eversion strength  5/5 EHL  5/5 FHL    Neurologic Normal    Sensation Intact to light touch throughout sural, saphenous, superficial peroneal, deep peroneal and medial/lateral plantar nerve distributions  Cleveland-Delfina 5 07 filament (10g) testing was deferred  Cardiovascular Brisk capillary refill < 2 seconds,intact DP and PT pulses    Special Tests None      Imaging Studies:   MRI of left foot available, reviewed showing no stress fracture or reaction at the 5th MT, mild peroneus longus tendinosis   Reviewed by me personally  Scribe Attestation    I,:  Belinda Camarillo PA-C am acting as a scribe while in the presence of the attending physician :       I,:  Gilda Combs MD personally performed the services described in this documentation    as scribed in my presence :             Hosie Domino Lachman, MD  Foot & Ankle Surgery   Department of [address] [place] [address]      I personally performed the service  Hosie Domino Lachman, MD

## 2022-03-29 NOTE — PATIENT INSTRUCTIONS
[employer], [employer] Balance, [employer] are good brands but I recommend going to a dedicate shoe store (not [employer] or [employer] ) At these types of stores, they have experts that can fit you for shoes appropriate for your foot problem  [employer]  [address] [place] [address], [place] Gregory Tolliver [address], [address]    [facility]'s [address] [facility],[address] [place], [address] [place] [address]    [employer]   [address], [address] [facility]  [address] [facility], [place], [facility] [place]     [employer]  [address], [place], [address], [address] [place] [address]    [employer]  [address], [place], [place]

## 2022-05-17 ENCOUNTER — OFFICE VISIT (OUTPATIENT)
Dept: FAMILY MEDICINE CLINIC | Facility: CLINIC | Age: 37
End: 2022-05-17
Payer: COMMERCIAL

## 2022-05-17 VITALS — SYSTOLIC BLOOD PRESSURE: 120 MMHG | DIASTOLIC BLOOD PRESSURE: 80 MMHG | WEIGHT: 161 LBS | BODY MASS INDEX: 25.99 KG/M2

## 2022-05-17 DIAGNOSIS — F43.22 ADJUSTMENT DISORDER WITH ANXIOUS MOOD: ICD-10-CM

## 2022-05-17 DIAGNOSIS — F41.1 GENERALIZED ANXIETY DISORDER: ICD-10-CM

## 2022-05-17 PROCEDURE — 99214 OFFICE O/P EST MOD 30 MIN: CPT | Performed by: FAMILY MEDICINE

## 2022-05-17 PROCEDURE — 1036F TOBACCO NON-USER: CPT | Performed by: FAMILY MEDICINE

## 2022-05-17 RX ORDER — LORAZEPAM 0.5 MG/1
0.5 TABLET ORAL
Qty: 30 TABLET | Refills: 0 | Status: SHIPPED | OUTPATIENT
Start: 2022-05-17 | End: 2022-08-02 | Stop reason: SDUPTHER

## 2022-05-17 NOTE — PROGRESS NOTES
Assessment/Plan:  Generalized anxiety disorder  The patient is noted recent recurrent/worsening of her anxiety  She appears to have panic disorder with some degree of agoraphobia  We are going to have her resume sertraline at 50 milligrams daily  In the past this had been effective though at a dose of 75  Will institute at 48 and asked her to call in 2-3 weeks with report of the effectiveness of this intervention  She is asked call sooner seek more urgent medical attention if she has any significant worsening  We discussed potential side effects X cetera  She agrees with this plan  Diagnoses and all orders for this visit:    Generalized anxiety disorder  -     sertraline (Zoloft) 50 mg tablet; Take 1 tablet (50 mg total) by mouth in the morning   -     LORazepam (ATIVAN) 0 5 mg tablet; Take 1 tablet (0 5 mg total) by mouth daily at bedtime as needed for anxiety    Adjustment disorder with anxious mood  -     sertraline (Zoloft) 50 mg tablet; Take 1 tablet (50 mg total) by mouth in the morning   -     LORazepam (ATIVAN) 0 5 mg tablet; Take 1 tablet (0 5 mg total) by mouth daily at bedtime as needed for anxiety          Subjective:   Chief Complaint   Patient presents with    Restart anti-depressant     Was on sertraline previously  Patient ID: Evelia Tena is a 39 y o  female  I took myself off sertraline 2020  Irritability noted by parents  More panic, I feel like I need to jump out of my skin  Agoraphobia, driving on highways  Sleep good, interest level ok, energy good, concentration +/-, appetite ok, no suicidal ideation ideation  Occasional uses lorazepam       HPI  The patient is a 58-year-old female with a history of anxiety and depression who presents today for re-evaluation  She states that she had been taking sertraline in 2020 decided to discontinue it as she did not feel that it was necessary to continue at that time    She notes that recently her parents noted that she has been more irritable  Her daughter asked her mommy are you okay  She has suffered some panic while driving  She has some degree of agoraphobia  She feels like I need to jump out of my skin  She does note that her sleep is been good and her interest level is okay  Her energy is been good and her concentration is so-so  Her appetite is fine  She has no suicidal ideation  She has been occasionally using lorazepam  The following portions of the patient's history were reviewed and updated as appropriate: allergies, current medications, past family history, past medical history, past social history, past surgical history and problem list     ROS    Per the HPI  Objective:    Physical Exam  Vitals and nursing note reviewed  Constitutional:       Appearance: Normal appearance  Neck:      Comments: No thyroid enlargement or nodule  Cardiovascular:      Rate and Rhythm: Normal rate and regular rhythm  Heart sounds: No murmur heard  Pulmonary:      Effort: Pulmonary effort is normal       Breath sounds: Normal breath sounds  No wheezing, rhonchi or rales  Lymphadenopathy:      Cervical: No cervical adenopathy  Neurological:      Mental Status: She is alert and oriented to person, place, and time  Psychiatric:         Behavior: Behavior normal          Thought Content:  Thought content normal          Judgment: Judgment normal       Comments: Appears somewhat anxious

## 2022-05-17 NOTE — ASSESSMENT & PLAN NOTE
The patient is noted recent recurrent/worsening of her anxiety  She appears to have panic disorder with some degree of agoraphobia  We are going to have her resume sertraline at 50 milligrams daily  In the past this had been effective though at a dose of 75  Will institute at 48 and asked her to call in 2-3 weeks with report of the effectiveness of this intervention  She is asked call sooner seek more urgent medical attention if she has any significant worsening  We discussed potential side effects X cetera  She agrees with this plan

## 2022-06-08 DIAGNOSIS — F43.22 ADJUSTMENT DISORDER WITH ANXIOUS MOOD: ICD-10-CM

## 2022-06-08 DIAGNOSIS — F41.1 GENERALIZED ANXIETY DISORDER: ICD-10-CM

## 2022-06-23 ENCOUNTER — RA CDI HCC (OUTPATIENT)
Dept: OTHER | Facility: HOSPITAL | Age: 37
End: 2022-06-23

## 2022-06-23 NOTE — PROGRESS NOTES
[facility]  coding opportunities       Chart reviewed, no opportunity found: CHART REVIEWED, NO OPPORTUNITY FOUND        Patients Insurance        Commercial Insurance: [address] [place] [address]

## 2022-06-30 ENCOUNTER — OFFICE VISIT (OUTPATIENT)
Dept: FAMILY MEDICINE CLINIC | Facility: CLINIC | Age: 37
End: 2022-06-30
Payer: COMMERCIAL

## 2022-06-30 DIAGNOSIS — Z11.52 ENCOUNTER FOR SCREENING FOR COVID-19: Primary | ICD-10-CM

## 2022-06-30 LAB
SARS-COV-2 AG UPPER RESP QL IA: NEGATIVE
VALID CONTROL: NORMAL

## 2022-06-30 PROCEDURE — 87811 SARS-COV-2 COVID19 W/OPTIC: CPT | Performed by: FAMILY MEDICINE

## 2022-06-30 NOTE — LETTER
June 30, 2022     Patient: Giovanny Aguirre  YOB: 1985  Date of Visit: 6/30/2022      To Whom it May Concern:    Jasmina Parikh is under my professional care  Kody Lorenz was seen in my office on 6/30/2022  Kody Lorenz had a COVID-19 antigen test performed today  It was negative  She should be allowed to travel without restriction  If you have any questions or concerns, please don't hesitate to call           Sincerely,          Nitin Stewart MD        CC: No Recipients

## 2022-06-30 NOTE — PROGRESS NOTES
The patient presented today at office today for a COVID-19 test for travel  It was performed and was negative  She was given a note to travel without restriction

## 2022-08-02 DIAGNOSIS — F41.1 GENERALIZED ANXIETY DISORDER: ICD-10-CM

## 2022-08-02 DIAGNOSIS — F43.22 ADJUSTMENT DISORDER WITH ANXIOUS MOOD: ICD-10-CM

## 2022-08-02 RX ORDER — LORAZEPAM 0.5 MG/1
0.5 TABLET ORAL
Qty: 30 TABLET | Refills: 0 | Status: SHIPPED | OUTPATIENT
Start: 2022-08-02

## 2022-10-21 ENCOUNTER — ANNUAL EXAM (OUTPATIENT)
Dept: OBGYN CLINIC | Facility: CLINIC | Age: 37
End: 2022-10-21
Payer: COMMERCIAL

## 2022-10-21 VITALS
BODY MASS INDEX: 26.68 KG/M2 | WEIGHT: 166 LBS | DIASTOLIC BLOOD PRESSURE: 78 MMHG | HEIGHT: 66 IN | SYSTOLIC BLOOD PRESSURE: 130 MMHG

## 2022-10-21 DIAGNOSIS — Z01.419 ENCOUNTER FOR GYNECOLOGICAL EXAMINATION WITHOUT ABNORMAL FINDING: Primary | ICD-10-CM

## 2022-10-21 PROCEDURE — S0612 ANNUAL GYNECOLOGICAL EXAMINA: HCPCS | Performed by: PHYSICIAN ASSISTANT

## 2022-10-21 PROCEDURE — G0476 HPV COMBO ASSAY CA SCREEN: HCPCS | Performed by: PHYSICIAN ASSISTANT

## 2022-10-21 NOTE — PROGRESS NOTES
Assessment/Plan:    No problem-specific Assessment & Plan notes found for this encounter  Diagnoses and all orders for this visit:    Encounter for gynecological examination without abnormal finding  -     Liquid-based pap, screening        Pap done  Call if periods worsen or change  If no problems, patient to return in 1 year for routine gyn care  Subjective:      Patient ID: Shila Myles is a 39 y o  female  Patient is here for yearly gyn exam   States she is doing well overall  Had [place] IUD placed November 2020  Gets occasional cramping and spotting  Denies bowel/bladder changes, pelvic pain, bloating, abdominal pain, n/v, change in appetite, and thyroid disease  Patient is performing self-breast exam   Denies new masses, skin changes, nipple discharge, and pain/tenderness  The following portions of the patient's history were reviewed and updated as appropriate: allergies, current medications, past family history, past medical history, past social history, past surgical history and problem list     Review of Systems   Constitutional: Negative for appetite change and unexpected weight change  Cardiovascular:        No masses, skin changes, nipple discharge, and pain/tenderness  Gastrointestinal: Negative for abdominal distention, abdominal pain, constipation, diarrhea, nausea and vomiting  Genitourinary: Negative for difficulty urinating, dysuria, frequency, genital sores, hematuria, menstrual problem, pelvic pain, urgency, vaginal bleeding, vaginal discharge and vaginal pain  Objective:      /78 (BP Location: Left arm)   Ht 5' 6" (1 676 m)   Wt 75 3 kg (166 lb)   BMI 26 79 kg/m²          Physical Exam  Vitals reviewed  Exam conducted with a chaperone present  Constitutional:       Appearance: Normal appearance  She is well-developed  Neck:      Thyroid: No thyromegaly     Pulmonary:      Effort: Pulmonary effort is normal    Chest:   Breasts: Breasts are symmetrical       Right: Normal  No swelling, bleeding, inverted nipple, mass, nipple discharge, skin change, tenderness, axillary adenopathy or supraclavicular adenopathy  Left: Normal  No swelling, bleeding, inverted nipple, mass, nipple discharge, skin change, tenderness, axillary adenopathy or supraclavicular adenopathy  Abdominal:      General: Abdomen is flat  There is no distension  Palpations: Abdomen is soft  Tenderness: There is no abdominal tenderness  Genitourinary:     General: Normal vulva  Pubic Area: No rash  Labia:         Right: No rash, tenderness, lesion or injury  Left: No rash, tenderness, lesion or injury  Vagina: Normal  No vaginal discharge, erythema, tenderness or bleeding  Cervix: Normal       Uterus: Normal        Adnexa: Right adnexa normal and left adnexa normal         Right: No mass, tenderness or fullness  Left: No mass, tenderness or fullness  Comments: IUD strings visualized  Musculoskeletal:      Cervical back: Neck supple  Lymphadenopathy:      Cervical: No cervical adenopathy  Upper Body:      Right upper body: No supraclavicular or axillary adenopathy  Left upper body: No supraclavicular or axillary adenopathy  Lower Body: No right inguinal adenopathy  No left inguinal adenopathy  Skin:     General: Skin is warm and dry  Neurological:      Mental Status: She is alert and oriented to person, place, and time  Psychiatric:         Mood and Affect: Mood normal          Behavior: Behavior normal  Behavior is cooperative  Thought Content:  Thought content normal          Judgment: Judgment normal

## 2022-10-25 LAB
HPV HR 12 DNA CVX QL NAA+PROBE: POSITIVE
HPV16 DNA CVX QL NAA+PROBE: NEGATIVE
HPV18 DNA CVX QL NAA+PROBE: NEGATIVE

## 2022-11-01 LAB
LAB AP GYN PRIMARY INTERPRETATION: ABNORMAL
Lab: ABNORMAL
PATH INTERP SPEC-IMP: ABNORMAL

## 2022-11-02 ENCOUNTER — TELEPHONE (OUTPATIENT)
Dept: OBGYN CLINIC | Facility: CLINIC | Age: 37
End: 2022-11-02

## 2022-11-02 NOTE — TELEPHONE ENCOUNTER
Spoke with patient regarding Pap results - ASCUS with positive other HR HPV  Needs colposcopy f/u; will schedule today  Call with further questions

## 2022-12-13 ENCOUNTER — PROCEDURE VISIT (OUTPATIENT)
Dept: OBGYN CLINIC | Facility: CLINIC | Age: 37
End: 2022-12-13

## 2022-12-13 VITALS
WEIGHT: 166.8 LBS | DIASTOLIC BLOOD PRESSURE: 80 MMHG | HEIGHT: 66 IN | SYSTOLIC BLOOD PRESSURE: 138 MMHG | BODY MASS INDEX: 26.81 KG/M2

## 2022-12-13 DIAGNOSIS — R87.610 ASCUS WITH POSITIVE HIGH RISK HPV CERVICAL: Primary | ICD-10-CM

## 2022-12-13 DIAGNOSIS — R87.810 ASCUS WITH POSITIVE HIGH RISK HPV CERVICAL: Primary | ICD-10-CM

## 2022-12-14 NOTE — PROGRESS NOTES
Assessment/Plan:     Diagnoses and all orders for this visit:    ASCUS with positive high risk HPV cervical  -     Tissue Exam      74-year-old female  ASCUS/HPV POS  IUD contraception  Plan  Colposcopy ECC and biopsy at 6/12  Will call patient with results    Subjective:      Patient ID: Fernando Carcamo is a 44 y o  female  HPI  Patient seen evaluated present to the office today for colposcopy secondary to ASCUS HPV positive procedure explained and discussed with patient all patient questions answered and patient was satisfied      The following portions of the patient's history were reviewed and updated as appropriate: allergies, current medications, past family history, past medical history, past social history, past surgical history and problem list     Review of Systems      Objective:      /80 (BP Location: Left arm, Patient Position: Sitting, Cuff Size: Adult)   Ht 5' 6" (1 676 m)   Wt 75 7 kg (166 lb 12 8 oz)   BMI 26 92 kg/m²          Physical Exam         Colposcopy     Date/Time 12/13/2022 8:39 PM     Universal Protocol   Consent: Verbal consent obtained  Risks and benefits: risks, benefits and alternatives were discussed  Consent given by: patient  Time out: Immediately prior to procedure a "time out" was called to verify the correct patient, procedure, equipment, support staff and site/side marked as required    Patient understanding: patient states understanding of the procedure being performed  Patient consent: the patient's understanding of the procedure matches consent given  Procedure consent: procedure consent matches procedure scheduled  Patient identity confirmed: verbally with patient       Performed by  Yuliana Pendleton MD     Authorized by Yuliana Pendleton MD        Pre-procedure details     Pre-procedure timeout performed: yes      Prepped with: acetic acid       Indication    ASC-US     Procedure Details   Procedure: Colposcopy w/ cervical biopsy and ECC      Under satisfactory analgesia the patient was prepped and draped in the dorsal lithotomy position: yes      [place] speculum was placed in the vagina: yes      Under colposcopic examination the transition zone was seen in entirety: yes      Endocervix was curetted using a Kevorkian curette: yes      Cervical biopsy performed with a cervical biopsy punch: yes      Monsel's solution was applied: yes      Biopsy(s): yes      Location:  12/6    Specimen to pathology: yes       Post-procedure      Findings: White epithelium      Patient tolerance of procedure:   Tolerated well, no immediate complications

## 2023-01-05 ENCOUNTER — OFFICE VISIT (OUTPATIENT)
Dept: FAMILY MEDICINE CLINIC | Facility: CLINIC | Age: 38
End: 2023-01-05

## 2023-01-05 VITALS
HEIGHT: 66 IN | HEART RATE: 71 BPM | SYSTOLIC BLOOD PRESSURE: 120 MMHG | TEMPERATURE: 99 F | DIASTOLIC BLOOD PRESSURE: 70 MMHG | WEIGHT: 171 LBS | BODY MASS INDEX: 27.48 KG/M2 | OXYGEN SATURATION: 97 %

## 2023-01-05 DIAGNOSIS — F41.1 GENERALIZED ANXIETY DISORDER: ICD-10-CM

## 2023-01-05 DIAGNOSIS — Z13.0 SCREENING FOR DEFICIENCY ANEMIA: ICD-10-CM

## 2023-01-05 DIAGNOSIS — F43.22 ADJUSTMENT DISORDER WITH ANXIOUS MOOD: ICD-10-CM

## 2023-01-05 DIAGNOSIS — Z13.220 SCREENING FOR LIPID DISORDERS: ICD-10-CM

## 2023-01-05 DIAGNOSIS — Z13.29 SCREENING FOR THYROID DISORDER: ICD-10-CM

## 2023-01-05 DIAGNOSIS — F33.1 MODERATE EPISODE OF RECURRENT MAJOR DEPRESSIVE DISORDER (HCC): ICD-10-CM

## 2023-01-05 DIAGNOSIS — Z00.00 ANNUAL PHYSICAL EXAM: Primary | ICD-10-CM

## 2023-01-05 DIAGNOSIS — Z13.29 SCREENING FOR ENDOCRINE DISORDER: ICD-10-CM

## 2023-01-05 RX ORDER — LORAZEPAM 0.5 MG/1
0.5 TABLET ORAL
Qty: 30 TABLET | Refills: 0 | Status: SHIPPED | OUTPATIENT
Start: 2023-01-05

## 2023-01-05 NOTE — PROGRESS NOTES
[id number]    NAME: Jonathan Sanabria  AGE: 44 y o  SEX: female  : 1985     DATE: 2023     Assessment and Plan:     Problem List Items Addressed This Visit        Other    Adjustment disorder with anxious mood    Relevant Medications    sertraline (ZOLOFT) 50 mg tablet    LORazepam (ATIVAN) 0 5 mg tablet    Generalized anxiety disorder     Stable with prn use of ativan  pdmp checked today last filled 2022  Agreement signed with office         Relevant Medications    sertraline (ZOLOFT) 50 mg tablet    LORazepam (ATIVAN) 0 5 mg tablet    Moderate episode of recurrent major depressive disorder ([facility] [address] )     Recommend increase zoloft to 75mg daily, recheck in a few weeks, may benefit from higher dose  Continue with self care regimen  Relaxation techniques  Routine         Relevant Medications    sertraline (ZOLOFT) 50 mg tablet    LORazepam (ATIVAN) 0 5 mg tablet   Other Visit Diagnoses     Annual physical exam    -  Primary    Screening for deficiency anemia        Relevant Orders    CBC and differential    Comprehensive metabolic panel    Screening for endocrine disorder        Relevant Orders    CBC and differential    Comprehensive metabolic panel    Screening for lipid disorders        Relevant Orders    Lipid Panel with Direct LDL reflex    Screening for thyroid disorder        Relevant Orders    TSH, 3rd generation with Free T4 reflex          Immunizations and preventive care screenings were discussed with patient today  Appropriate education was printed on patient's after visit summary  Counseling:  Alcohol/drug use: discussed moderation in alcohol intake, the recommendations for healthy alcohol use, and avoidance of illicit drug use  Dental Health: discussed importance of regular tooth brushing, flossing, and dental visits    Injury prevention: discussed safety/seat belts, safety helmets, smoke detectors, carbon dioxide detectors, and smoking near bedding or upholstery  Sexual health: discussed sexually transmitted diseases, partner selection, use of condoms, avoidance of unintended pregnancy, and contraceptive alternatives  · Exercise: the importance of regular exercise/physical activity was discussed  Recommend exercise 3-5 times per week for at least 30 minutes  No follow-ups on file  Chief Complaint:     Chief Complaint   Patient presents with   • Establish Care     Established med check, due for physical dr Tree Hanks,         History of Present Illness:     Adult Annual Physical   Patient here for a comprehensive physical exam  The patient reports no problems  Feels she is doing well  Depression is a little worse, usually worsens in winter months  Ativan uses once a week at most sometimes    Offers no other issues or concerns today    Diet and Physical Activity  · Diet/Nutrition: well balanced diet and consuming 3-5 servings of fruits/vegetables daily  · Exercise: vigorous cardiovascular exercise, strength training exercises, 3-4 times a week on average and running  Depression Screening  PHQ-2/9 Depression Screening    Little interest or pleasure in doing things: 0 - not at all  Feeling down, depressed, or hopeless: 0 - not at all  Trouble falling or staying asleep, or sleeping too much: 1 - several days  Feeling tired or having little energy: 1 - several days  Poor appetite or overeatin - not at all  Feeling bad about yourself - or that you are a failure or have let yourself or your family down: 0 - not at all  Trouble concentrating on things, such as reading the newspaper or watching television: 1 - several days  Moving or speaking so slowly that other people could have noticed   Or the opposite - being so fidgety or restless that you have been moving around a lot more than usual: 1 - several days  Thoughts that you would be better off dead, or of hurting yourself in some way: 0 - not at all  PHQ-9 Score: 4   PHQ-9 Interpretation: No or Minimal depression        General Health  · Sleep: sleeps well, gets 4-6 hours of sleep on average and has a hrd time falling asleep at times  · Hearing: normal - bilateral   · Vision: no vision problems  · Dental: regular dental visits and brushes teeth twice daily  /GYN Health  · Last menstrual period: 1 5 weeks ago  · Contraceptive method: IUD placement  · History of STDs?: no      Review of Systems:     Review of Systems   Constitutional: Negative  Negative for chills and fever  HENT: Negative  Negative for ear pain and sore throat  Eyes: Negative for pain and visual disturbance  Respiratory: Negative  Negative for cough and shortness of breath  Cardiovascular: Negative  Negative for chest pain and palpitations  Gastrointestinal: Negative  Negative for abdominal pain and vomiting  Endocrine: Negative  Genitourinary: Negative  Negative for dysuria and hematuria  Musculoskeletal: Negative  Negative for arthralgias and back pain  Skin: Negative for color change and rash  Neurological: Negative  Negative for seizures and syncope  Psychiatric/Behavioral: Positive for dysphoric mood and sleep disturbance  Negative for suicidal ideas  The patient is nervous/anxious  All other systems reviewed and are negative  Past Medical History:     Past Medical History:   Diagnosis Date   • Abnormal Pap smear of cervix    • Grief reaction 08/10/2016   • Herpes simplex type 1 infection     last assessed 8/8/13, resolved 4/20/17       Past Surgical History:     Past Surgical History:   Procedure Laterality Date   • WISDOM TOOTH EXTRACTION        Social History:     Social History     Socioeconomic History   • Marital status:       Spouse name: None   • Number of children: None   • Years of education: None   • Highest education level: None   Occupational History   • Occupation: Teacher    Tobacco Use   • Smoking status: Never • Smokeless tobacco: Never   Vaping Use   • Vaping Use: Never used   Substance and Sexual Activity   • Alcohol use: Yes     Comment: social   • Drug use: No   • Sexual activity: Yes     Partners: Male     Birth control/protection: I U D  Other Topics Concern   • None   Social History Narrative     per [employer]      Social Determinants of Health     Financial Resource Strain: Not on file   Food Insecurity: Not on file   Transportation Needs: Not on file   Physical Activity: Not on file   Stress: Not on file   Social Connections: Not on file   Intimate Partner Violence: Not on file   Housing Stability: Not on file      Family History:     Family History   Problem Relation Age of Onset   • Hypertension Mother    • Diabetes Father    • Breast cancer Paternal Aunt    • Ovarian cancer Cousin       Current Medications:     Current Outpatient Medications   Medication Sig Dispense Refill   • levonorgestrel (KYLEENA) 19 5 MG intrauterine device 1 Intra Uterine Device by Intrauterine route once     • LORazepam (ATIVAN) 0 5 mg tablet Take 1 tablet (0 5 mg total) by mouth daily at bedtime as needed for anxiety 30 tablet 0   • sertraline (ZOLOFT) 50 mg tablet Take 1 5 tablets (75 mg total) by mouth daily 135 tablet 1     No current facility-administered medications for this visit  Allergies: Allergies   Allergen Reactions   • Moxifloxacin Hives     Reaction Date: [id number];       Physical Exam:     /70   Pulse 71   Temp 99 °F (37 2 °C)   Ht 5' 6" (1 676 m)   Wt 77 6 kg (171 lb)   SpO2 97%   BMI 27 60 kg/m²     Physical Exam  Vitals and nursing note reviewed  Constitutional:       General: She is not in acute distress  Appearance: Normal appearance  She is well-developed and normal weight  HENT:      Head: Normocephalic and atraumatic        Right Ear: Tympanic membrane, ear canal and external ear normal       Left Ear: Tympanic membrane, ear canal and external ear normal       Nose: Nose normal       Mouth/Throat:      Mouth: Mucous membranes are moist       Pharynx: Oropharynx is clear  Eyes:      Conjunctiva/sclera: Conjunctivae normal       Pupils: Pupils are equal, round, and reactive to light  Neck:      Thyroid: No thyromegaly or thyroid tenderness  Cardiovascular:      Rate and Rhythm: Normal rate and regular rhythm  Pulses:           Radial pulses are 2+ on the right side and 2+ on the left side  Heart sounds: Normal heart sounds  No murmur heard  Pulmonary:      Effort: Pulmonary effort is normal  No respiratory distress  Breath sounds: Normal breath sounds  Abdominal:      General: Abdomen is flat  Bowel sounds are normal       Palpations: Abdomen is soft  Tenderness: There is no abdominal tenderness  Musculoskeletal:      Cervical back: Neck supple  Right lower leg: No edema  Left lower leg: No edema  Lymphadenopathy:      Cervical: No cervical adenopathy  Skin:     General: Skin is warm and dry  Capillary Refill: Capillary refill takes less than 2 seconds  Neurological:      Mental Status: She is alert and oriented to person, place, and time     Psychiatric:         Mood and Affect: Mood normal          Behavior: Behavior normal           Tsering García, [address] [facility] [facility] [address]

## 2023-01-05 NOTE — ASSESSMENT & PLAN NOTE
Recommend increase zoloft to 75mg daily, recheck in a few weeks, may benefit from higher dose  Continue with self care regimen  Relaxation techniques  Routine

## 2023-01-05 NOTE — ASSESSMENT & PLAN NOTE
Stable with prn use of ativan  pdmp checked today last filled 8/2/2022  Agreement signed with office

## 2023-02-01 LAB
ALBUMIN SERPL-MCNC: 4.5 G/DL (ref 3.6–5.1)
ALBUMIN/GLOB SERPL: 1.5 (CALC) (ref 1–2.5)
ALP SERPL-CCNC: 52 U/L (ref 31–125)
ALT SERPL-CCNC: 24 U/L (ref 6–29)
AST SERPL-CCNC: 30 U/L (ref 10–30)
BASOPHILS # BLD AUTO: 39 CELLS/UL (ref 0–200)
BASOPHILS NFR BLD AUTO: 0.7 %
BILIRUB SERPL-MCNC: 0.9 MG/DL (ref 0.2–1.2)
BUN SERPL-MCNC: 12 MG/DL (ref 7–25)
BUN/CREAT SERPL: NORMAL (CALC) (ref 6–22)
CALCIUM SERPL-MCNC: 9.2 MG/DL (ref 8.6–10.2)
CHLORIDE SERPL-SCNC: 102 MMOL/L (ref 98–110)
CHOLEST SERPL-MCNC: 213 MG/DL
CHOLEST/HDLC SERPL: 3 (CALC)
CO2 SERPL-SCNC: 29 MMOL/L (ref 20–32)
CREAT SERPL-MCNC: 0.69 MG/DL (ref 0.5–0.97)
EOSINOPHIL # BLD AUTO: 22 CELLS/UL (ref 15–500)
EOSINOPHIL NFR BLD AUTO: 0.4 %
ERYTHROCYTE [DISTWIDTH] IN BLOOD BY AUTOMATED COUNT: 12.4 % (ref 11–15)
GFR/BSA.PRED SERPLBLD CYS-BASED-ARV: 114 ML/MIN/1.73M2
GLOBULIN SER CALC-MCNC: 3 G/DL (CALC) (ref 1.9–3.7)
GLUCOSE SERPL-MCNC: 94 MG/DL (ref 65–99)
HCT VFR BLD AUTO: 36.9 % (ref 35–45)
HDLC SERPL-MCNC: 72 MG/DL
HGB BLD-MCNC: 12.8 G/DL (ref 11.7–15.5)
LDLC SERPL CALC-MCNC: 123 MG/DL (CALC)
LYMPHOCYTES # BLD AUTO: 1678 CELLS/UL (ref 850–3900)
LYMPHOCYTES NFR BLD AUTO: 30.5 %
MCH RBC QN AUTO: 32.4 PG (ref 27–33)
MCHC RBC AUTO-ENTMCNC: 34.7 G/DL (ref 32–36)
MCV RBC AUTO: 93.4 FL (ref 80–100)
MONOCYTES # BLD AUTO: 413 CELLS/UL (ref 200–950)
MONOCYTES NFR BLD AUTO: 7.5 %
NEUTROPHILS # BLD AUTO: 3350 CELLS/UL (ref 1500–7800)
NEUTROPHILS NFR BLD AUTO: 60.9 %
NONHDLC SERPL-MCNC: 141 MG/DL (CALC)
PLATELET # BLD AUTO: 230 THOUSAND/UL (ref 140–400)
PMV BLD REES-ECKER: 10.2 FL (ref 7.5–12.5)
POTASSIUM SERPL-SCNC: 4.2 MMOL/L (ref 3.5–5.3)
PROT SERPL-MCNC: 7.5 G/DL (ref 6.1–8.1)
RBC # BLD AUTO: 3.95 MILLION/UL (ref 3.8–5.1)
SODIUM SERPL-SCNC: 138 MMOL/L (ref 135–146)
TRIGL SERPL-MCNC: 81 MG/DL
TSH SERPL-ACNC: 2.71 MIU/L
WBC # BLD AUTO: 5.5 THOUSAND/UL (ref 3.8–10.8)

## 2023-02-02 ENCOUNTER — TELEPHONE (OUTPATIENT)
Dept: FAMILY MEDICINE CLINIC | Facility: CLINIC | Age: 38
End: 2023-02-02

## 2023-02-02 NOTE — TELEPHONE ENCOUNTER
----- Message from Rangel Macedo, [address] sent at 2/2/2023  8:18 AM EST -----  Your blood work looks good, cholesterol is a little high but the ratio of good to bad cholesterol is good  Recommend healthy diet and exercise

## 2023-06-05 ENCOUNTER — OFFICE VISIT (OUTPATIENT)
Dept: FAMILY MEDICINE CLINIC | Facility: CLINIC | Age: 38
End: 2023-06-05
Payer: COMMERCIAL

## 2023-06-05 VITALS
HEIGHT: 66 IN | SYSTOLIC BLOOD PRESSURE: 127 MMHG | DIASTOLIC BLOOD PRESSURE: 75 MMHG | OXYGEN SATURATION: 97 % | TEMPERATURE: 97.3 F | BODY MASS INDEX: 27.48 KG/M2 | WEIGHT: 171 LBS | HEART RATE: 55 BPM

## 2023-06-05 DIAGNOSIS — F41.1 GENERALIZED ANXIETY DISORDER: Primary | ICD-10-CM

## 2023-06-05 DIAGNOSIS — F33.1 MODERATE EPISODE OF RECURRENT MAJOR DEPRESSIVE DISORDER (HCC): ICD-10-CM

## 2023-06-05 PROCEDURE — 99213 OFFICE O/P EST LOW 20 MIN: CPT | Performed by: NURSE PRACTITIONER

## 2023-06-05 NOTE — PROGRESS NOTES
Name: Terrell Osman      :       MRN: [id number]  Encounter Provider: GRANT Solano  Encounter Date: 2023   Encounter department: [place] [id number]  Generalized anxiety disorder  Assessment & Plan:  Stable with prn use of ativan  pdmp checked today, last filled 2023  Agreement up to date in office    Orders:  -     sertraline (ZOLOFT) 50 mg tablet; Take 1 5 tablets (75 mg total) by mouth daily    2  Moderate episode of recurrent major depressive disorder (HCC)  Assessment & Plan:  Stable with zoloft to 75mg daily  Continue with self care regimen  Relaxation techniques             Subjective      Here today for medication check  Has been doing well since increasing zoloft in January to 75mg daily  Uses ativan very sparingly  [place]  23 labs are normal except cholesterol borderline elevated    Has been physically active with running just finished a mud run with her 6year old daughter  Review of Systems   Constitutional: Negative  Negative for chills and fever  HENT: Negative  Negative for ear pain and sore throat  Eyes: Negative for pain and visual disturbance  Respiratory: Negative  Negative for cough and shortness of breath  Cardiovascular: Negative  Negative for chest pain and palpitations  Gastrointestinal: Negative  Negative for abdominal pain and vomiting  Endocrine: Negative  Genitourinary: Negative  Negative for dysuria and hematuria  Musculoskeletal: Negative  Negative for arthralgias and back pain  Skin: Negative for color change and rash  Neurological: Negative  Negative for seizures and syncope  Psychiatric/Behavioral: Positive for dysphoric mood and sleep disturbance  Negative for suicidal ideas  The patient is nervous/anxious  Feeling much better since increasing zoloft     All other systems reviewed and are negative        Current "Outpatient Medications on File Prior to Visit   Medication Sig   • levonorgestrel (KYLEENA) 19 5 MG intrauterine device 1 Intra Uterine Device by Intrauterine route once   • LORazepam (ATIVAN) 0 5 mg tablet Take 1 tablet (0 5 mg total) by mouth daily at bedtime as needed for anxiety   • [DISCONTINUED] sertraline (ZOLOFT) 50 mg tablet Take 1 5 tablets (75 mg total) by mouth daily       Objective     /75   Pulse 55   Temp (!) 97 3 °F (36 3 °C) (Tympanic)   Ht 5' 6\" (1 676 m)   Wt 77 6 kg (171 lb)   SpO2 97%   BMI 27 60 kg/m²     Physical Exam  Vitals and nursing note reviewed  Constitutional:       General: She is not in acute distress  Appearance: Normal appearance  HENT:      Head: Normocephalic  Eyes:      Conjunctiva/sclera: Conjunctivae normal       Pupils: Pupils are equal, round, and reactive to light  Cardiovascular:      Rate and Rhythm: Normal rate and regular rhythm  Heart sounds: Normal heart sounds  Pulmonary:      Effort: No respiratory distress  Breath sounds: Normal breath sounds  Abdominal:      Palpations: Abdomen is soft  Neurological:      Mental Status: She is alert and oriented to person, place, and time     Psychiatric:         Mood and Affect: Mood normal          Behavior: Behavior normal        GRANT Sanchez  "

## 2023-06-05 NOTE — ASSESSMENT & PLAN NOTE
Stable with prn use of ativan  pdmp checked today, last filled 1/2023  Agreement up to date in office

## 2023-10-16 DIAGNOSIS — F41.1 GENERALIZED ANXIETY DISORDER: ICD-10-CM

## 2023-10-16 DIAGNOSIS — F43.22 ADJUSTMENT DISORDER WITH ANXIOUS MOOD: ICD-10-CM

## 2023-10-16 RX ORDER — LORAZEPAM 0.5 MG/1
0.5 TABLET ORAL
Qty: 30 TABLET | Refills: 0 | Status: SHIPPED | OUTPATIENT
Start: 2023-10-16

## 2023-10-16 NOTE — TELEPHONE ENCOUNTER
Carmela Rodriguez YOB: 1985. The prescription that needs to be refilled is Lorazepam .5 milligrams. Please contact me if this request is available. Thank you. My phone number is [phone]. Thank you so much.  deepak Jamil.

## 2023-10-26 ENCOUNTER — ANNUAL EXAM (OUTPATIENT)
Dept: OBGYN CLINIC | Facility: CLINIC | Age: 38
End: 2023-10-26
Payer: COMMERCIAL

## 2023-10-26 VITALS
BODY MASS INDEX: 28.77 KG/M2 | SYSTOLIC BLOOD PRESSURE: 120 MMHG | WEIGHT: 179 LBS | HEIGHT: 66 IN | DIASTOLIC BLOOD PRESSURE: 88 MMHG

## 2023-10-26 DIAGNOSIS — Z01.419 ENCOUNTER FOR GYNECOLOGICAL EXAMINATION WITHOUT ABNORMAL FINDING: Primary | ICD-10-CM

## 2023-10-26 DIAGNOSIS — Z11.51 SCREENING FOR HPV (HUMAN PAPILLOMAVIRUS): ICD-10-CM

## 2023-10-26 PROCEDURE — G0476 HPV COMBO ASSAY CA SCREEN: HCPCS | Performed by: PHYSICIAN ASSISTANT

## 2023-10-26 PROCEDURE — G0145 SCR C/V CYTO,THINLAYER,RESCR: HCPCS | Performed by: PHYSICIAN ASSISTANT

## 2023-10-26 PROCEDURE — S0612 ANNUAL GYNECOLOGICAL EXAMINA: HCPCS | Performed by: PHYSICIAN ASSISTANT

## 2023-10-26 NOTE — PROGRESS NOTES
Assessment/Plan:    No problem-specific Assessment & Plan notes found for this encounter. Diagnoses and all orders for this visit:    Encounter for gynecological examination without abnormal finding  -     Liquid-based pap, screening    Screening for HPV (human papillomavirus)  -     Liquid-based pap, screening        Pap done. Call if periods worsen or change. If no problems, patient to return in 1 year for routine gyn care. Subjective:      Patient ID: Virginia Michele is a 44 y.o. female. Patient is here for yearly gyn exam.  States she is doing well overall. Had 800 Hiperos IUD placed in 2020. Gets occasional spotting. Denies bowel/bladder changes, pelvic pain, bloating, abdominal pain, n/v, change in appetite, and thyroid disease. Pap last year was ASCUS with positive other HR HPV; colposcopy showed LENA 1. Patient is performing self-breast exam.  Denies new masses, skin changes, nipple discharge, and pain/tenderness. The following portions of the patient's history were reviewed and updated as appropriate: allergies, current medications, past family history, past medical history, past social history, past surgical history, and problem list.    Review of Systems   Constitutional:  Negative for appetite change and unexpected weight change. Cardiovascular:         No masses, skin changes, nipple discharge, and pain/tenderness. Gastrointestinal:  Negative for abdominal distention, abdominal pain, constipation, diarrhea, nausea and vomiting. Genitourinary:  Negative for difficulty urinating, dysuria, frequency, genital sores, hematuria, menstrual problem, pelvic pain, urgency, vaginal bleeding, vaginal discharge and vaginal pain. Objective:      /88 (BP Location: Left arm, Patient Position: Sitting, Cuff Size: Adult)   Ht 5' 6" (1.676 m)   Wt 81.2 kg (179 lb)   LMP 10/03/2023 (Approximate)   BMI 28.89 kg/m²          Physical Exam  Vitals reviewed.  Exam conducted with a chaperone present. Constitutional:       Appearance: Normal appearance. She is well-developed. Neck:      Thyroid: No thyromegaly. Pulmonary:      Effort: Pulmonary effort is normal.   Chest:   Breasts:     Breasts are symmetrical.      Right: Normal. No swelling, bleeding, inverted nipple, mass, nipple discharge, skin change or tenderness. Left: Normal. No swelling, bleeding, inverted nipple, mass, nipple discharge, skin change or tenderness. Abdominal:      General: Abdomen is flat. There is no distension. Palpations: Abdomen is soft. Tenderness: There is no abdominal tenderness. Genitourinary:     General: Normal vulva. Pubic Area: No rash. Labia:         Right: No rash, tenderness, lesion or injury. Left: No rash, tenderness, lesion or injury. Vagina: Normal. No vaginal discharge, erythema, tenderness or bleeding. Cervix: Normal.      Uterus: Normal.       Adnexa: Right adnexa normal and left adnexa normal.        Right: No mass, tenderness or fullness. Left: No mass, tenderness or fullness. Comments: IUD strings visualized. Musculoskeletal:      Cervical back: Neck supple. Lymphadenopathy:      Cervical: No cervical adenopathy. Upper Body:      Right upper body: No supraclavicular or axillary adenopathy. Left upper body: No supraclavicular or axillary adenopathy. Lower Body: No right inguinal adenopathy. No left inguinal adenopathy. Skin:     General: Skin is warm and dry. Neurological:      Mental Status: She is alert and oriented to person, place, and time. Psychiatric:         Mood and Affect: Mood normal.         Behavior: Behavior normal. Behavior is cooperative. Thought Content:  Thought content normal.         Judgment: Judgment normal.

## 2023-11-01 LAB
LAB AP GYN PRIMARY INTERPRETATION: NORMAL
Lab: NORMAL

## 2023-11-06 ENCOUNTER — TELEPHONE (OUTPATIENT)
Dept: OBGYN CLINIC | Facility: CLINIC | Age: 38
End: 2023-11-06

## 2023-11-06 NOTE — TELEPHONE ENCOUNTER
Spoke with patient regarding Pap results - cytology negative with positive other HR HPV. Colposcopy last year showed LENA 1. Per [facility]P guidelines, repeat Pap in 1 year. Call with further questions.

## 2024-01-03 ENCOUNTER — OFFICE VISIT (OUTPATIENT)
Dept: FAMILY MEDICINE CLINIC | Facility: CLINIC | Age: 39
End: 2024-01-03
Payer: COMMERCIAL

## 2024-01-03 VITALS
DIASTOLIC BLOOD PRESSURE: 80 MMHG | SYSTOLIC BLOOD PRESSURE: 120 MMHG | WEIGHT: 182 LBS | BODY MASS INDEX: 29.25 KG/M2 | HEIGHT: 66 IN | HEART RATE: 55 BPM | OXYGEN SATURATION: 96 % | TEMPERATURE: 96.2 F

## 2024-01-03 DIAGNOSIS — Z13.1 SCREENING FOR DIABETES MELLITUS: ICD-10-CM

## 2024-01-03 DIAGNOSIS — F41.1 GENERALIZED ANXIETY DISORDER: ICD-10-CM

## 2024-01-03 DIAGNOSIS — Z13.0 SCREENING FOR DEFICIENCY ANEMIA: ICD-10-CM

## 2024-01-03 DIAGNOSIS — F33.1 MODERATE EPISODE OF RECURRENT MAJOR DEPRESSIVE DISORDER (HCC): ICD-10-CM

## 2024-01-03 DIAGNOSIS — Z13.220 SCREENING CHOLESTEROL LEVEL: ICD-10-CM

## 2024-01-03 DIAGNOSIS — Z00.00 ANNUAL PHYSICAL EXAM: Primary | ICD-10-CM

## 2024-01-03 DIAGNOSIS — Z13.29 SCREENING FOR THYROID DISORDER: ICD-10-CM

## 2024-01-03 PROCEDURE — 99395 PREV VISIT EST AGE 18-39: CPT | Performed by: NURSE PRACTITIONER

## 2024-01-03 NOTE — PROGRESS NOTES
ADULT ANNUAL PHYSICAL  [facility]    NAME: Latoya Gallardo  AGE: 38 y.o. SEX: female  : 1985     DATE: 1/3/2024     Assessment and Plan:     Problem List Items Addressed This Visit    None  Visit Diagnoses       Screening for thyroid disorder    -  Primary    Relevant Orders    TSH, 3rd generation with Free T4 reflex    Screening cholesterol level        Relevant Orders    Lipid Panel with Direct LDL reflex    Screening for deficiency anemia        Relevant Orders    CBC and differential    Screening for diabetes mellitus        Relevant Orders    Comprehensive metabolic panel              Immunizations and preventive care screenings were discussed with patient today. Appropriate education was printed on patient's after visit summary.    Counseling:  Alcohol/drug use: discussed moderation in alcohol intake, the recommendations for healthy alcohol use, and avoidance of illicit drug use.  Dental Health: discussed importance of regular tooth brushing, flossing, and dental visits.  Injury prevention: discussed safety/seat belts, safety helmets, smoke detectors, carbon dioxide detectors, and smoking near bedding or upholstery.  Sexual health: discussed sexually transmitted diseases, partner selection, use of condoms, avoidance of unintended pregnancy, and contraceptive alternatives.  Exercise: the importance of regular exercise/physical activity was discussed. Recommend exercise 3-5 times per week for at least 30 minutes.       Depression Screening and Follow-up Plan: Patient was screened for depression during today's encounter. They screened negative with a PHQ-9 score of 3.        No follow-ups on file.     Chief Complaint:     Chief Complaint   Patient presents with   • Physical Exam     Physical       History of Present Illness:     Adult Annual Physical   Patient here for a comprehensive physical exam. The patient reports no problems.    Doing well with  sertraline, has needed ativan only as needed, last filled 10/2023    Diet and Physical Activity  Diet/Nutrition: well balanced diet, consuming 3-5 servings of fruits/vegetables daily, and trying to get back into the routine .   Exercise:  trying to get back into things .      Depression Screening  PHQ-2/9 Depression Screening    Little interest or pleasure in doing things: 0 - not at all  Feeling down, depressed, or hopeless: 0 - not at all  Trouble falling or staying asleep, or sleeping too much: 1 - several days  Feeling tired or having little energy: 1 - several days  Poor appetite or overeatin - not at all  Feeling bad about yourself - or that you are a failure or have let yourself or your family down: 0 - not at all  Trouble concentrating on things, such as reading the newspaper or watching television: 1 - several days  Moving or speaking so slowly that other people could have noticed. Or the opposite - being so fidgety or restless that you have been moving around a lot more than usual: 0 - not at all  Thoughts that you would be better off dead, or of hurting yourself in some way: 0 - not at all  PHQ-9 Score: 3  PHQ-9 Interpretation: No or Minimal depression       General Health  Sleep:  has hard time falling asleep at times, feels mind is racing .   Hearing: normal - bilateral.  Vision: no vision problems.   Dental: regular dental visits, brushes teeth twice daily, and just go visalign .       /GYN Health  Follows with gynecology? yes   Last menstrual period: only gets spotting with IUD  Contraceptive method: IUD placement.         Review of Systems:     Review of Systems   Constitutional: Negative.  Negative for chills and fever.   HENT: Negative.  Negative for ear pain and sore throat.    Eyes:  Negative for pain and visual disturbance.   Respiratory: Negative.  Negative for cough and shortness of breath.    Cardiovascular: Negative.  Negative for chest pain and palpitations.   Gastrointestinal:  Negative.  Negative for abdominal pain and vomiting.   Endocrine: Negative.    Genitourinary: Negative.  Negative for dysuria and hematuria.   Musculoskeletal: Negative.  Negative for arthralgias and back pain.   Skin:  Negative for color change and rash.   Neurological: Negative.  Negative for seizures and syncope.   Psychiatric/Behavioral:  Positive for dysphoric mood and sleep disturbance. Negative for suicidal ideas. The patient is nervous/anxious.         Feeling much better since increasing zoloft     All other systems reviewed and are negative.     Past Medical History:     Past Medical History:   Diagnosis Date   • Abnormal Pap smear of cervix    • Grief reaction 08/10/2016   • Herpes simplex type 1 infection     last assessed 8/8/13, resolved 4/20/17       Past Surgical History:     Past Surgical History:   Procedure Laterality Date   • WISDOM TOOTH EXTRACTION        Social History:     Social History     Socioeconomic History   • Marital status:      Spouse name: None   • Number of children: None   • Years of education: None   • Highest education level: None   Occupational History   • Occupation: Teacher    Tobacco Use   • Smoking status: Never   • Smokeless tobacco: Never   Vaping Use   • Vaping status: Never Used   Substance and Sexual Activity   • Alcohol use: Yes     Alcohol/week: 5.0 standard drinks of alcohol     Types: 5 Cans of beer per week     Comment: social   • Drug use: No   • Sexual activity: Yes     Partners: Male     Birth control/protection: Condom Male, I.U.D.   Other Topics Concern   • None   Social History Narrative     per Mary      Social Determinants of Health     Financial Resource Strain: Not on file   Food Insecurity: Not on file   Transportation Needs: Not on file   Physical Activity: Not on file   Stress: Not on file   Social Connections: Not on file   Intimate Partner Violence: Not on file   Housing Stability: Not on file      Family History:     Family  "History   Problem Relation Age of Onset   • Hypertension Mother    • Diabetes Father    • Breast cancer Paternal Aunt    • Ovarian cancer Cousin       Current Medications:     Current Outpatient Medications   Medication Sig Dispense Refill   • levonorgestrel (KYLEENA) 19.5 MG intrauterine device 1 Intra Uterine Device by Intrauterine route once     • LORazepam (ATIVAN) 0.5 mg tablet Take 1 tablet (0.5 mg total) by mouth daily at bedtime as needed for anxiety 30 tablet 0   • sertraline (ZOLOFT) 50 mg tablet Take 1.5 tablets (75 mg total) by mouth daily 135 tablet 1     No current facility-administered medications for this visit.      Allergies:     Allergies   Allergen Reactions   • Moxifloxacin Hives     Reaction Date: 08Jan2010;       Physical Exam:     /80   Pulse 55   Temp (!) 96.2 °F (35.7 °C) (Tympanic)   Ht 5' 6\" (1.676 m)   Wt 82.6 kg (182 lb)   SpO2 96%   BMI 29.38 kg/m²     Physical Exam  Vitals and nursing note reviewed.   Constitutional:       General: She is not in acute distress.     Appearance: Normal appearance. She is well-developed and normal weight.   HENT:      Head: Normocephalic and atraumatic.      Right Ear: Tympanic membrane, ear canal and external ear normal.      Left Ear: Tympanic membrane, ear canal and external ear normal.      Nose: Nose normal.      Mouth/Throat:      Mouth: Mucous membranes are moist.      Pharynx: Oropharynx is clear.   Eyes:      Conjunctiva/sclera: Conjunctivae normal.      Pupils: Pupils are equal, round, and reactive to light.   Neck:      Thyroid: No thyromegaly or thyroid tenderness.   Cardiovascular:      Rate and Rhythm: Normal rate and regular rhythm.      Pulses:           Radial pulses are 2+ on the right side and 2+ on the left side.      Heart sounds: Normal heart sounds. No murmur heard.  Pulmonary:      Effort: Pulmonary effort is normal. No respiratory distress.      Breath sounds: Normal breath sounds.   Abdominal:      General: Abdomen " is flat. Bowel sounds are normal.      Palpations: Abdomen is soft.      Tenderness: There is no abdominal tenderness.   Musculoskeletal:      Cervical back: Neck supple.      Right lower leg: No edema.      Left lower leg: No edema.   Lymphadenopathy:      Cervical: No cervical adenopathy.   Skin:     General: Skin is warm and dry.      Capillary Refill: Capillary refill takes less than 2 seconds.   Neurological:      Mental Status: She is alert and oriented to person, place, and time.   Psychiatric:         Mood and Affect: Mood normal.         Behavior: Behavior normal.          GRANT Berger   [facility]

## 2024-01-11 DIAGNOSIS — F41.1 GENERALIZED ANXIETY DISORDER: ICD-10-CM

## 2024-02-08 ENCOUNTER — CLINICAL SUPPORT (OUTPATIENT)
Dept: FAMILY MEDICINE CLINIC | Facility: CLINIC | Age: 39
End: 2024-02-08
Payer: COMMERCIAL

## 2024-02-08 DIAGNOSIS — Z13.0 SCREENING FOR DEFICIENCY ANEMIA: ICD-10-CM

## 2024-02-08 DIAGNOSIS — Z13.29 SCREENING FOR THYROID DISORDER: Primary | ICD-10-CM

## 2024-02-08 DIAGNOSIS — Z13.1 SCREENING FOR DIABETES MELLITUS: ICD-10-CM

## 2024-02-08 DIAGNOSIS — Z13.220 SCREENING CHOLESTEROL LEVEL: ICD-10-CM

## 2024-02-08 PROCEDURE — 36415 COLL VENOUS BLD VENIPUNCTURE: CPT | Performed by: NURSE PRACTITIONER

## 2024-02-09 ENCOUNTER — TELEPHONE (OUTPATIENT)
Dept: FAMILY MEDICINE CLINIC | Facility: CLINIC | Age: 39
End: 2024-02-09

## 2024-02-09 LAB
ALBUMIN SERPL-MCNC: 4.8 G/DL (ref 3.9–4.9)
ALBUMIN/GLOB SERPL: 1.8 {RATIO} (ref 1.2–2.2)
ALP SERPL-CCNC: 67 IU/L (ref 44–121)
ALT SERPL-CCNC: 20 IU/L (ref 0–32)
AST SERPL-CCNC: 24 IU/L (ref 0–40)
BASOPHILS # BLD AUTO: 0 X10E3/UL (ref 0–0.2)
BASOPHILS NFR BLD AUTO: 0 %
BILIRUB SERPL-MCNC: 0.7 MG/DL (ref 0–1.2)
BUN SERPL-MCNC: 12 MG/DL (ref 6–20)
BUN/CREAT SERPL: 17 (ref 9–23)
CALCIUM SERPL-MCNC: 9.7 MG/DL (ref 8.7–10.2)
CHLORIDE SERPL-SCNC: 100 MMOL/L (ref 96–106)
CHOLEST SERPL-MCNC: 288 MG/DL (ref 100–199)
CO2 SERPL-SCNC: 21 MMOL/L (ref 20–29)
CREAT SERPL-MCNC: 0.72 MG/DL (ref 0.57–1)
CYTOLOGY CMNT CVX/VAG CYTO-IMP: ABNORMAL
EGFR: 109 ML/MIN/1.73
EOSINOPHIL # BLD AUTO: 0 X10E3/UL (ref 0–0.4)
EOSINOPHIL NFR BLD AUTO: 1 %
ERYTHROCYTE [DISTWIDTH] IN BLOOD BY AUTOMATED COUNT: 12.6 % (ref 11.7–15.4)
GLOBULIN SER-MCNC: 2.7 G/DL (ref 1.5–4.5)
GLUCOSE SERPL-MCNC: 91 MG/DL (ref 70–99)
HCT VFR BLD AUTO: 38 % (ref 34–46.6)
HDLC SERPL-MCNC: 67 MG/DL
HGB BLD-MCNC: 13.1 G/DL (ref 11.1–15.9)
IMM GRANULOCYTES # BLD: 0 X10E3/UL (ref 0–0.1)
IMM GRANULOCYTES NFR BLD: 0 %
LDLC SERPL CALC-MCNC: 211 MG/DL (ref 0–99)
LDLC/HDLC SERPL: 3.1 RATIO (ref 0–3.2)
LYMPHOCYTES # BLD AUTO: 1.6 X10E3/UL (ref 0.7–3.1)
LYMPHOCYTES NFR BLD AUTO: 22 %
MCH RBC QN AUTO: 32 PG (ref 26.6–33)
MCHC RBC AUTO-ENTMCNC: 34.5 G/DL (ref 31.5–35.7)
MCV RBC AUTO: 93 FL (ref 79–97)
MONOCYTES # BLD AUTO: 0.6 X10E3/UL (ref 0.1–0.9)
MONOCYTES NFR BLD AUTO: 9 %
NEUTROPHILS # BLD AUTO: 5.1 X10E3/UL (ref 1.4–7)
NEUTROPHILS NFR BLD AUTO: 68 %
PLATELET # BLD AUTO: 244 X10E3/UL (ref 150–450)
POTASSIUM SERPL-SCNC: 4.4 MMOL/L (ref 3.5–5.2)
PROT SERPL-MCNC: 7.5 G/DL (ref 6–8.5)
RBC # BLD AUTO: 4.1 X10E6/UL (ref 3.77–5.28)
SL AMB VLDL CHOLESTEROL CALC: 10 MG/DL (ref 5–40)
SODIUM SERPL-SCNC: 138 MMOL/L (ref 134–144)
TRIGL SERPL-MCNC: 65 MG/DL (ref 0–149)
TSH SERPL DL<=0.005 MIU/L-ACNC: 2.93 UIU/ML (ref 0.45–4.5)
WBC # BLD AUTO: 7.4 X10E3/UL (ref 3.4–10.8)

## 2024-02-09 NOTE — RESULT ENCOUNTER NOTE
Franklin Klein, Your labs all look great except your cholesterol is higher than last year likely genetic. To consider statin therapy but if she feels she can make some dietary changes in terms of less fat and processed meals, increasing cardiovascular exercise we can monitor again in another 6 months to see if that makes an improvement in numbers.

## 2024-02-09 NOTE — TELEPHONE ENCOUNTER
----- Message from GRANT Berger sent at 2/9/2024 10:38 AM EST -----  Hi Latoya, Your labs all look great except your cholesterol is higher than last year likely genetic. To consider statin therapy but if she feels she can make some dietary changes in terms of less fat and processed meals, increasing cardiovascular exercise we can monitor again in another 6 months to see if that makes an improvement in numbers.

## 2024-06-05 ENCOUNTER — OFFICE VISIT (OUTPATIENT)
Dept: FAMILY MEDICINE CLINIC | Facility: CLINIC | Age: 39
End: 2024-06-05
Payer: COMMERCIAL

## 2024-06-05 VITALS
HEART RATE: 73 BPM | WEIGHT: 183 LBS | BODY MASS INDEX: 29.41 KG/M2 | HEIGHT: 66 IN | DIASTOLIC BLOOD PRESSURE: 80 MMHG | SYSTOLIC BLOOD PRESSURE: 114 MMHG | TEMPERATURE: 96.8 F | OXYGEN SATURATION: 97 %

## 2024-06-05 DIAGNOSIS — F33.1 MODERATE EPISODE OF RECURRENT MAJOR DEPRESSIVE DISORDER (HCC): ICD-10-CM

## 2024-06-05 DIAGNOSIS — E78.01 FAMILIAL HYPERCHOLESTEROLEMIA: Primary | ICD-10-CM

## 2024-06-05 DIAGNOSIS — F41.1 GENERALIZED ANXIETY DISORDER: ICD-10-CM

## 2024-06-05 DIAGNOSIS — F43.22 ADJUSTMENT DISORDER WITH ANXIOUS MOOD: ICD-10-CM

## 2024-06-05 PROCEDURE — 99214 OFFICE O/P EST MOD 30 MIN: CPT | Performed by: NURSE PRACTITIONER

## 2024-06-05 RX ORDER — LORAZEPAM 0.5 MG/1
0.5 TABLET ORAL
Qty: 30 TABLET | Refills: 0 | Status: SHIPPED | OUTPATIENT
Start: 2024-06-05

## 2024-06-05 NOTE — PROGRESS NOTES
Ambulatory Visit  Name: Latoya Gallardo      : 1985      MRN: [id number]  Encounter Provider: GRANT Berger  Encounter Date: 2024   Encounter department: [facility]    Assessment & Plan   1. Familial hypercholesterolemia  Assessment & Plan:  Check labs in 3 months  Mediterrnean diet tip sheet given  Increase cardiovascular excerise  Orders:  -     Lipid panel; Future  -     Comprehensive metabolic panel; Future  -     Lipid panel  -     Comprehensive metabolic panel  2. Generalized anxiety disorder  Assessment & Plan:  Stable with prn use of ativan  pdmp checked today  Agreement up to date  Orders:  -     LORazepam (ATIVAN) 0.5 mg tablet; Take 1 tablet (0.5 mg total) by mouth daily at bedtime as needed for anxiety  3. Adjustment disorder with anxious mood  -     LORazepam (ATIVAN) 0.5 mg tablet; Take 1 tablet (0.5 mg total) by mouth daily at bedtime as needed for anxiety  4. Moderate episode of recurrent major depressive disorder (HCC)  Assessment & Plan:  Stable with zoloft to 75mg daily  Continue with self care regimen  Relaxation techniques         History of Present Illness   {Disappearing Hyperlinks I Encounters * My Last Note * Since Last Visit * History :53183}  Here today for routine follow up.  Daughter is done with school already but soccer ends -     Had labs in 3/2024 cholesterol levels worsened  Lab Results       Component                Value               Date                       CHOLESTEROL              288 (H)             2024                 CHOLESTEROL              213 (H)             2023            Lab Results       Component                Value               Date                       HDL                      67                  2024                 HDL                      72                  2023            Lab Results       Component                Value               Date                       TRIG                   "   65                  02/08/2024                 TRIG                     81                  02/01/2023            No results found for: \"NONHDLC\"              Review of Systems   Constitutional: Negative.  Negative for chills and fever.   HENT: Negative.  Negative for ear pain and sore throat.    Eyes:  Negative for pain and visual disturbance.   Respiratory: Negative.  Negative for cough and shortness of breath.    Cardiovascular: Negative.  Negative for chest pain and palpitations.   Gastrointestinal: Negative.  Negative for abdominal pain and vomiting.   Endocrine: Negative.    Genitourinary: Negative.  Negative for dysuria and hematuria.   Musculoskeletal: Negative.  Negative for arthralgias and back pain.   Skin:  Negative for color change and rash.   Neurological: Negative.  Negative for seizures and syncope.   Psychiatric/Behavioral:  Positive for dysphoric mood and sleep disturbance. Negative for suicidal ideas. The patient is nervous/anxious.         Feeling much better since increasing zoloft     All other systems reviewed and are negative.      Objective   {Disappearing Hyperlinks   Review Vitals * Enter New Vitals * Results Review * Labs * Imaging * Cardiology * Procedures * Lung Cancer Screening :94056}  /80 (BP Location: Right arm, Patient Position: Sitting, Cuff Size: Standard)   Pulse 73   Temp (!) 96.8 °F (36 °C) (Tympanic)   Ht 5' 6\" (1.676 m)   Wt 83 kg (183 lb)   SpO2 97%   BMI 29.54 kg/m²     Physical Exam  Vitals and nursing note reviewed.   Constitutional:       General: She is not in acute distress.     Appearance: Normal appearance. She is well-developed and normal weight.   HENT:      Head: Normocephalic.   Eyes:      Conjunctiva/sclera: Conjunctivae normal.      Pupils: Pupils are equal, round, and reactive to light.   Neck:      Thyroid: No thyromegaly or thyroid tenderness.   Cardiovascular:      Rate and Rhythm: Normal rate and regular rhythm.      Pulses:           " Radial pulses are 2+ on the right side and 2+ on the left side.      Heart sounds: Normal heart sounds. No murmur heard.  Pulmonary:      Effort: Pulmonary effort is normal. No respiratory distress.      Breath sounds: Normal breath sounds.   Musculoskeletal:      Right lower leg: No edema.      Left lower leg: No edema.   Skin:     General: Skin is warm and dry.      Capillary Refill: Capillary refill takes less than 2 seconds.   Neurological:      Mental Status: She is alert and oriented to person, place, and time.   Psychiatric:         Mood and Affect: Mood normal.         Behavior: Behavior normal.       Administrative Statements {Disappearing Hyperlinks I  Level of Service * [facility]/PCSP:[id number]}

## 2024-09-19 DIAGNOSIS — F43.22 ADJUSTMENT DISORDER WITH ANXIOUS MOOD: ICD-10-CM

## 2024-09-19 DIAGNOSIS — F41.1 GENERALIZED ANXIETY DISORDER: ICD-10-CM

## 2024-09-19 RX ORDER — LORAZEPAM 0.5 MG/1
0.5 TABLET ORAL
Qty: 30 TABLET | Refills: 0 | Status: SHIPPED | OUTPATIENT
Start: 2024-09-19

## 2024-09-19 NOTE — TELEPHONE ENCOUNTER
Reason for call:   [] Refill   [x] Prior Auth  [] Other:     Office:   [x] PCP/Provider - LOREN MARTE / GRANT Berger   [] Specialty/Provider -     Medication: LORazepam (ATIVAN) 0.5 mg tablet     Dose/Frequency: Take 1 tablet (0.5 mg total) by mouth daily at bedtime as needed for anxiety     Quantity: 30 tablet     Pharmacy: [facility] #[id number]  [place] - [address] [place] [address]     Does the patient have enough for 3 days?   [] Yes   [x] No - Send as HP to POD

## 2024-10-28 ENCOUNTER — ANNUAL EXAM (OUTPATIENT)
Dept: OBGYN CLINIC | Facility: CLINIC | Age: 39
End: 2024-10-28
Payer: COMMERCIAL

## 2024-10-28 VITALS
BODY MASS INDEX: 30.05 KG/M2 | SYSTOLIC BLOOD PRESSURE: 122 MMHG | DIASTOLIC BLOOD PRESSURE: 82 MMHG | HEIGHT: 66 IN | WEIGHT: 187 LBS

## 2024-10-28 DIAGNOSIS — Z12.31 ENCOUNTER FOR SCREENING MAMMOGRAM FOR BREAST CANCER: ICD-10-CM

## 2024-10-28 DIAGNOSIS — Z01.419 ENCOUNTER FOR GYNECOLOGICAL EXAMINATION WITHOUT ABNORMAL FINDING: Primary | ICD-10-CM

## 2024-10-28 PROCEDURE — G0476 HPV COMBO ASSAY CA SCREEN: HCPCS | Performed by: PHYSICIAN ASSISTANT

## 2024-10-28 PROCEDURE — S0612 ANNUAL GYNECOLOGICAL EXAMINA: HCPCS | Performed by: PHYSICIAN ASSISTANT

## 2024-10-28 PROCEDURE — G0145 SCR C/V CYTO,THINLAYER,RESCR: HCPCS | Performed by: PHYSICIAN ASSISTANT

## 2024-10-28 NOTE — PROGRESS NOTES
"Assessment/Plan:      Diagnoses and all orders for this visit:    Encounter for gynecological examination without abnormal finding  -     Liquid-based pap, screening    Encounter for screening mammogram for breast cancer  -     Mammo screening bilateral w 3d and cad; Future        Pap done.  Order for mammogram entered.  Call if periods worsen or change.  If no problems, patient to return in 1 year for routine gyn care.    Subjective:     Patient ID: Latoya Gallardo is a 38 y.o. female.    Patient is here for yearly gyn exam.  States she is doing well overall.  Had Kyleena IUD placed November 2020.  Does not get a period; sometimes has a few days of spotting.  Denies bowel/bladder changes, pelvic pain, bloating, abdominal pain, n/v, change in appetite, and thyroid disease.  Pap last year was negative cytology with positive other HR HPV; colposcopy in 2022 was benign.    Due for first mammogram in November.  Patient is performing self-breast exam.  Denies new masses, skin changes, nipple discharge, and pain/tenderness.        Review of Systems   Constitutional:  Negative for appetite change and unexpected weight change.   Cardiovascular:         No masses, skin changes, nipple discharge, and pain/tenderness.   Gastrointestinal:  Negative for abdominal distention, abdominal pain, constipation, diarrhea, nausea and vomiting.   Genitourinary:  Negative for difficulty urinating, dysuria, frequency, genital sores, hematuria, menstrual problem, pelvic pain, urgency, vaginal bleeding, vaginal discharge and vaginal pain.         Objective:  Visit Vitals  /82 (BP Location: Left arm, Patient Position: Sitting, Cuff Size: Adult)   Ht 5' 6\" (1.676 m)   Wt 84.8 kg (187 lb)   LMP 10/04/2024 (Approximate) Comment: Lionel   BMI 30.18 kg/m²   OB Status Implant   Smoking Status Never   BSA 1.94 m²         Physical Exam  Vitals reviewed. Exam conducted with a chaperone present.   Constitutional:       Appearance: Normal " appearance. She is well-developed.   Neck:      Thyroid: No thyromegaly.   Pulmonary:      Effort: Pulmonary effort is normal.   Chest:   Breasts:     Breasts are symmetrical.      Right: Normal. No swelling, bleeding, inverted nipple, mass, nipple discharge, skin change or tenderness.      Left: Normal. No swelling, bleeding, inverted nipple, mass, nipple discharge, skin change or tenderness.   Abdominal:      General: There is no distension.      Palpations: Abdomen is soft.      Tenderness: There is no abdominal tenderness.   Genitourinary:     General: Normal vulva.      Pubic Area: No rash.       Labia:         Right: No rash, tenderness, lesion or injury.         Left: No rash, tenderness, lesion or injury.       Vagina: Normal. No vaginal discharge, erythema, tenderness or bleeding.      Cervix: Normal.      Uterus: Normal.       Adnexa: Right adnexa normal and left adnexa normal.        Right: No mass, tenderness or fullness.          Left: No mass, tenderness or fullness.              Comments: IUD strings present.  Musculoskeletal:      Cervical back: Neck supple.   Lymphadenopathy:      Cervical: No cervical adenopathy.      Upper Body:      Right upper body: No supraclavicular or axillary adenopathy.      Left upper body: No supraclavicular or axillary adenopathy.      Lower Body: No right inguinal adenopathy. No left inguinal adenopathy.   Skin:     General: Skin is warm and dry.   Neurological:      Mental Status: She is alert and oriented to person, place, and time.   Psychiatric:         Behavior: Behavior normal. Behavior is cooperative.         Thought Content: Thought content normal.         Judgment: Judgment normal.

## 2024-11-01 LAB
LAB AP GYN PRIMARY INTERPRETATION: NORMAL
Lab: NORMAL

## 2024-11-06 ENCOUNTER — TELEPHONE (OUTPATIENT)
Age: 39
End: 2024-11-06

## 2024-11-08 ENCOUNTER — TELEPHONE (OUTPATIENT)
Dept: OBGYN CLINIC | Facility: CLINIC | Age: 39
End: 2024-11-08

## 2024-11-08 NOTE — TELEPHONE ENCOUNTER
Spoke with patient regarding Pap results - negative cytology with positive other HR HPV.  Per ASCCP guidelines, needs to repeat colposcopy.  She will schedule today.  Call with further questions.

## 2024-11-08 NOTE — TELEPHONE ENCOUNTER
Patient is returning call for results. Warm transfer to office states for encounter to be placed to the provider.

## 2024-11-21 ENCOUNTER — HOSPITAL ENCOUNTER (OUTPATIENT)
Dept: MAMMOGRAPHY | Facility: HOSPITAL | Age: 39
Discharge: HOME | End: 2024-11-21
Payer: COMMERCIAL

## 2024-11-21 VITALS — WEIGHT: 187 LBS | BODY MASS INDEX: 30.05 KG/M2 | HEIGHT: 66 IN

## 2024-11-21 DIAGNOSIS — Z12.31 ENCOUNTER FOR SCREENING MAMMOGRAM FOR BREAST CANCER: ICD-10-CM

## 2024-11-21 PROCEDURE — 77063 BREAST TOMOSYNTHESIS BI: CPT

## 2024-11-21 PROCEDURE — 77067 SCR MAMMO BI INCL CAD: CPT

## 2024-12-10 ENCOUNTER — PROCEDURE VISIT (OUTPATIENT)
Dept: OBGYN CLINIC | Facility: CLINIC | Age: 39
End: 2024-12-10
Payer: COMMERCIAL

## 2024-12-10 VITALS
WEIGHT: 188.2 LBS | DIASTOLIC BLOOD PRESSURE: 100 MMHG | HEIGHT: 66 IN | SYSTOLIC BLOOD PRESSURE: 150 MMHG | BODY MASS INDEX: 30.25 KG/M2

## 2024-12-10 DIAGNOSIS — R87.810 CERVICAL HIGH RISK HPV (HUMAN PAPILLOMAVIRUS) TEST POSITIVE: Primary | ICD-10-CM

## 2024-12-10 PROCEDURE — 57454 BX/CURETT OF CERVIX W/SCOPE: CPT | Performed by: OBSTETRICS & GYNECOLOGY

## 2024-12-10 PROCEDURE — 88305 TISSUE EXAM BY PATHOLOGIST: CPT | Performed by: STUDENT IN AN ORGANIZED HEALTH CARE EDUCATION/TRAINING PROGRAM

## 2024-12-11 NOTE — PROGRESS NOTES
"Assessment/Plan:     Diagnoses and all orders for this visit:    Cervical high risk HPV (human papillomavirus) test positive  -     Tissue Exam       39-year-old female  ASCUS/HPV POS  IUD contraception  Plan  Colposcopy ECC and biopsy at 1/11  Will call patient with results    Subjective:      Patient ID: Latoya Gallardo is a 39 y.o. female.    HPI  Patient seen evaluated presented office today for colposcopy secondary to persistent HPV positive  Procedure explained and discussed with patient all patient questions answered and patient was satisfied      The following portions of the patient's history were reviewed and updated as appropriate: allergies, current medications, past family history, past medical history, past social history, past surgical history and problem list.    Review of Systems      Objective:      /100 (BP Location: Left arm, Patient Position: Sitting, Cuff Size: Adult)   Ht 5' 6\" (1.676 m)   Wt 85.4 kg (188 lb 3.2 oz)   LMP 12/04/2024   BMI 30.38 kg/m²          Physical Exam      Colposcopy    Date/Time: 12/10/2024 11:45 AM    Performed by: Albert Hogan MD  Authorized by: Albert Hogan MD    Verbal consent obtained?: Yes    Written consent obtained?: Yes    Risks and benefits: Risks, benefits and alternatives were discussed    Time Out:     Time out: Immediately prior to the procedure a time out was called    Patient states understanding of procedure being performed: Yes    Patient's understanding of procedure matches consent: Yes    Procedure consent matches procedure scheduled: Yes    Relevant documents present and verified: Yes    Test results available and properly labeled: Yes    Patient identity confirmed:  Verbally with patient  Pre-procedure:     Prepped with: acetic acid    Indication:     Indications: HPV positive.  Procedure:     Procedure: Colposcopy w/ cervical biopsy and ECC      Under satisfactory analgesia the patient was prepped and draped in the dorsal lithotomy " position: yes      Kensett speculum was placed in the vagina: yes      Cervix was cleansed with betadine: yes      Endocervix was curetted using a Kevorkian curette: yes      Cervical biopsy performed with a cervical biopsy punch: yes      Monsel's solution was applied: yes      Specimen(s) to pathology: yes    Post-procedure:     Findings: White epithelium      Patient tolerance of procedure:  Tolerated well, no immediate complications  Comments:      Biopsy obtained from 1/11

## 2024-12-13 PROCEDURE — 88305 TISSUE EXAM BY PATHOLOGIST: CPT | Performed by: STUDENT IN AN ORGANIZED HEALTH CARE EDUCATION/TRAINING PROGRAM

## 2024-12-15 ENCOUNTER — RESULTS FOLLOW-UP (OUTPATIENT)
Dept: OBGYN CLINIC | Facility: CLINIC | Age: 39
End: 2024-12-15

## 2025-01-07 ENCOUNTER — OFFICE VISIT (OUTPATIENT)
Dept: FAMILY MEDICINE CLINIC | Facility: CLINIC | Age: 40
End: 2025-01-07
Payer: COMMERCIAL

## 2025-01-07 VITALS
TEMPERATURE: 97.2 F | HEIGHT: 66 IN | DIASTOLIC BLOOD PRESSURE: 84 MMHG | SYSTOLIC BLOOD PRESSURE: 136 MMHG | WEIGHT: 189 LBS | BODY MASS INDEX: 30.37 KG/M2 | HEART RATE: 65 BPM | OXYGEN SATURATION: 98 %

## 2025-01-07 DIAGNOSIS — F41.1 GENERALIZED ANXIETY DISORDER: ICD-10-CM

## 2025-01-07 DIAGNOSIS — Z13.220 SCREENING FOR LIPID DISORDERS: ICD-10-CM

## 2025-01-07 DIAGNOSIS — E78.01 FAMILIAL HYPERCHOLESTEROLEMIA: ICD-10-CM

## 2025-01-07 DIAGNOSIS — Z13.29 SCREENING FOR THYROID DISORDER: ICD-10-CM

## 2025-01-07 DIAGNOSIS — F43.22 ADJUSTMENT DISORDER WITH ANXIOUS MOOD: ICD-10-CM

## 2025-01-07 DIAGNOSIS — Z23 ENCOUNTER FOR IMMUNIZATION: ICD-10-CM

## 2025-01-07 DIAGNOSIS — Z13.0 SCREENING FOR DEFICIENCY ANEMIA: ICD-10-CM

## 2025-01-07 DIAGNOSIS — Z00.00 ANNUAL PHYSICAL EXAM: Primary | ICD-10-CM

## 2025-01-07 PROCEDURE — 99396 PREV VISIT EST AGE 40-64: CPT | Performed by: NURSE PRACTITIONER

## 2025-01-07 PROCEDURE — 90471 IMMUNIZATION ADMIN: CPT

## 2025-01-07 PROCEDURE — 90715 TDAP VACCINE 7 YRS/> IM: CPT

## 2025-01-07 NOTE — PROGRESS NOTES
Adult Annual Physical  Name: Latoya Gallardo      : 1985      MRN: [id number]  Encounter Provider: GRANT Berger  Encounter Date: 2025   Encounter department: [facility]    Assessment & Plan  Annual physical exam         Familial hypercholesterolemia  Check labs 2025  Continue diet and exercise       Encounter for immunization  Tdap given today       Adjustment disorder with anxious mood  Stable, continue zoloft 75mg daily and ativan as needed  Pdmp reviewed  Agreement updated today       Screening for deficiency anemia         Screening for thyroid disorder         Screening for lipid disorders         Generalized anxiety disorder  Stable, continue zoloft 75mg daily and ativan as needed  Pdmp reviewed  Agreement updated today       Immunizations and preventive care screenings were discussed with patient today. Appropriate education was printed on patient's after visit summary.    Counseling:  Alcohol/drug use: discussed moderation in alcohol intake, the recommendations for healthy alcohol use, and avoidance of illicit drug use.  Dental Health: discussed importance of regular tooth brushing, flossing, and dental visits.  Injury prevention: discussed safety/seat belts, safety helmets, smoke detectors, carbon monoxide detectors, and smoking near bedding or upholstery.  Sexual health: discussed sexually transmitted diseases, partner selection, use of condoms, avoidance of unintended pregnancy, and contraceptive alternatives.  Exercise: the importance of regular exercise/physical activity was discussed. Recommend exercise 3-5 times per week for at least 30 minutes.       Depression Screening and Follow-up Plan: Patient was screened for depression during today's encounter. They screened negative with a PHQ-9 score of 2.      History of Present Illness     Adult Annual Physical:  Patient presents for annual physical. Had abnormal pap last year then had colposcopy.     Diet  "and Physical Activity:  - Diet/Nutrition: well balanced diet.  - Exercise: no formal exercise.    Depression Screening:    - PHQ-9 Score: 2    General Health:  - Sleep: sleeps well.  - Hearing: normal hearing right ear and normal hearing left ear.  - Vision: no vision problems. has glasses for night time driving  - Dental: regular dental visits.    Review of Systems   Constitutional: Negative.  Negative for chills and fever.   HENT: Negative.  Negative for ear pain and sore throat.    Eyes:  Negative for pain and visual disturbance.   Respiratory: Negative.  Negative for cough and shortness of breath.    Cardiovascular: Negative.  Negative for chest pain and palpitations.   Gastrointestinal: Negative.  Negative for abdominal pain and vomiting.   Endocrine: Negative.    Genitourinary: Negative.  Negative for dysuria and hematuria.   Musculoskeletal: Negative.  Negative for arthralgias and back pain.   Skin:  Negative for color change and rash.   Neurological: Negative.  Negative for seizures and syncope.   Psychiatric/Behavioral:  Positive for dysphoric mood and sleep disturbance. Negative for suicidal ideas. The patient is nervous/anxious.         Feeling much better since increasing zoloft     All other systems reviewed and are negative.        Objective   /84 (BP Location: Left arm, Patient Position: Sitting, Cuff Size: Standard)   Pulse 65   Temp (!) 97.2 °F (36.2 °C) (Tympanic)   Ht 5' 6\" (1.676 m)   Wt 85.7 kg (189 lb)   LMP 12/04/2024   SpO2 98%   BMI 30.51 kg/m²     Physical Exam  Vitals and nursing note reviewed.   Constitutional:       General: She is not in acute distress.     Appearance: Normal appearance. She is well-developed and normal weight.   HENT:      Head: Normocephalic and atraumatic.      Right Ear: Tympanic membrane, ear canal and external ear normal.      Left Ear: Tympanic membrane, ear canal and external ear normal.      Nose: Nose normal.      Mouth/Throat:      Mouth: Mucous " membranes are moist.      Pharynx: Oropharynx is clear.   Eyes:      Conjunctiva/sclera: Conjunctivae normal.      Pupils: Pupils are equal, round, and reactive to light.   Neck:      Thyroid: No thyromegaly or thyroid tenderness.   Cardiovascular:      Rate and Rhythm: Normal rate and regular rhythm.      Pulses:           Radial pulses are 2+ on the right side and 2+ on the left side.      Heart sounds: Normal heart sounds. No murmur heard.  Pulmonary:      Effort: Pulmonary effort is normal. No respiratory distress.      Breath sounds: Normal breath sounds.   Abdominal:      General: Abdomen is flat. Bowel sounds are normal.      Palpations: Abdomen is soft.      Tenderness: There is no abdominal tenderness.   Musculoskeletal:      Cervical back: Neck supple.      Right lower leg: No edema.      Left lower leg: No edema.   Lymphadenopathy:      Cervical: No cervical adenopathy.   Skin:     General: Skin is warm and dry.      Capillary Refill: Capillary refill takes less than 2 seconds.   Neurological:      Mental Status: She is alert and oriented to person, place, and time.   Psychiatric:         Mood and Affect: Mood normal.         Behavior: Behavior normal.

## 2025-01-13 NOTE — ASSESSMENT & PLAN NOTE
Stable, continue zoloft 75mg daily and ativan as needed  Pdmp reviewed  Agreement updated today

## 2025-05-19 ENCOUNTER — OFFICE VISIT (OUTPATIENT)
Dept: FAMILY MEDICINE CLINIC | Facility: CLINIC | Age: 40
End: 2025-05-19
Payer: COMMERCIAL

## 2025-05-19 VITALS
DIASTOLIC BLOOD PRESSURE: 88 MMHG | WEIGHT: 191 LBS | TEMPERATURE: 97.5 F | OXYGEN SATURATION: 97 % | SYSTOLIC BLOOD PRESSURE: 120 MMHG | HEIGHT: 66 IN | BODY MASS INDEX: 30.7 KG/M2 | HEART RATE: 75 BPM

## 2025-05-19 DIAGNOSIS — E78.01 FAMILIAL HYPERCHOLESTEROLEMIA: ICD-10-CM

## 2025-05-19 DIAGNOSIS — F41.1 GENERALIZED ANXIETY DISORDER: ICD-10-CM

## 2025-05-19 DIAGNOSIS — F33.1 MODERATE EPISODE OF RECURRENT MAJOR DEPRESSIVE DISORDER (HCC): Primary | ICD-10-CM

## 2025-05-19 DIAGNOSIS — F43.22 ADJUSTMENT DISORDER WITH ANXIOUS MOOD: ICD-10-CM

## 2025-05-19 DIAGNOSIS — E66.9 OBESITY (BMI 30-39.9): ICD-10-CM

## 2025-05-19 PROCEDURE — 99214 OFFICE O/P EST MOD 30 MIN: CPT | Performed by: NURSE PRACTITIONER

## 2025-05-19 RX ORDER — LORAZEPAM 0.5 MG/1
0.5 TABLET ORAL
Qty: 30 TABLET | Refills: 0 | Status: SHIPPED | OUTPATIENT
Start: 2025-05-19

## 2025-05-19 RX ORDER — PHENTERMINE HYDROCHLORIDE 37.5 MG/1
37.5 TABLET ORAL DAILY
Qty: 30 TABLET | Refills: 0 | Status: SHIPPED | OUTPATIENT
Start: 2025-05-19

## 2025-05-19 NOTE — ASSESSMENT & PLAN NOTE
Stable, continue zoloft 75mg daily and ativan as needed  Pdmp reviewed  Agreement updated today

## 2025-05-19 NOTE — PROGRESS NOTES
Name: Latoya Gallardo      : 1985      MRN: [id number]  Encounter Provider: GRANT Berger  Encounter Date: 2025   Encounter department: [facility]  :  Assessment & Plan  Moderate episode of recurrent major depressive disorder (HCC)  Stable with zoloft to 75mg daily  Continue with self care regimen  Relaxation techniques           Generalized anxiety disorder  Stable, continue zoloft 75mg daily and ativan as needed  Pdmp reviewed  Agreement updated today         Familial hypercholesterolemia  Due for labs- has order, can complete now  Continue diet and exercise         Adjustment disorder with anxious mood         Obesity (BMI 30-39.9)  Prior Authorization Clinical Questions for Weight Management Pharmacotherapy    1. Does the patient have a contrainidcation to medication prescribed for weight management?: No  2. Does the patient have a diagnosis of obesity, confirmed by a BMI greater than or equal to 30 kg/m^2?: Yes  3. Does the patient have a BMI of greater than or equal to 27 kg/m^2 with at least one weight-related comorbidity/risk factor/complication (e.g. diabetes, dyslipidemia, coronary artery disease)?: Yes  4. Weight-related co-morbidities/risk factors: dyslipidemia  5. WEGOVY CVA Indication: Does patient have established documented cardiovascular disease (history of a prior heart attack (myocardial infarction), stroke, or symptomatic peripheral arterial disease (PAD)?: No  6. ZEPBOUND MALATHI Indication: Does patient have documented MALATHI diagnosed via sleep study (insurance will require copy of sleep study results for approval)?: No  7. Has the patient been on a weight loss regimen of low-calorie diet, increased physical activity, and lifestyle modifications for a minimum of 6 months?: Yes  8. Has the patient completed a comprehensive weight loss program (ie, Weight Watchers, Noom, Bariatrics, other tam on phone)? If so, what?: No  9. Does the patient have a history of  type 2 diabetes?: No  10. Has the member tried and failed other weight loss medication within the past 12 months?: No  11. Will the member use requested medication in combination with another GLP agonist or weight loss drug?: No  12. Is the medication a controlled substance?: Yes  13. If controlled substance, has the PDMP been checked and verified?: Yes     Baseline weight (in pounds): 191 lbs  Current weight (in pounds): 191 lbs  Weight loss percentage: 0%     Discussed weight loss options  Continue dietary modifications and exercising  Start phentermine once daily in morning  Discussed side effects of medication and short term use  She will monitor her anxiety and may require to discontinue medication if she feels she is heightened taking it.  Referral given as well to weight management for accountability, nutritional guidance to help her develop a system that works for her to be successful in maintaining weight loss  Follow up in 4-6 weeks for weight and BP check    Orders:    phentermine (ADIPEX-P) 37.5 MG tablet; Take 1 tablet (37.5 mg total) by mouth in the morning    Ambulatory Referral to Weight Management; Future           History of Present Illness   Here today for routine med check.   Continues to do well with zoloft 75mg daily and ativan as needed    Due for labs now going this week    Discouraged with her weight  She has been eating better, increasing her protein, staying active and is gaining weight  She has been tracking her foods for more than 6 months without improvement on the scale.               Review of Systems   Constitutional:  Positive for unexpected weight change. Negative for chills and fever.   HENT: Negative.  Negative for ear pain and sore throat.    Eyes:  Negative for pain and visual disturbance.   Respiratory: Negative.  Negative for cough and shortness of breath.    Cardiovascular: Negative.  Negative for chest pain and palpitations.   Gastrointestinal: Negative.  Negative for  "abdominal pain and vomiting.   Endocrine: Negative.    Genitourinary: Negative.  Negative for dysuria and hematuria.   Musculoskeletal: Negative.  Negative for arthralgias and back pain.   Skin:  Negative for color change and rash.   Neurological: Negative.  Negative for seizures and syncope.   Psychiatric/Behavioral:  Positive for dysphoric mood and sleep disturbance. Negative for suicidal ideas. The patient is nervous/anxious.         Feeling much better since increasing zoloft     All other systems reviewed and are negative.      Objective   /88 (BP Location: Left arm, Patient Position: Sitting, Cuff Size: Large)   Pulse 75   Temp 97.5 °F (36.4 °C) (Tympanic)   Ht 5' 6\" (1.676 m)   Wt 86.6 kg (191 lb)   SpO2 97%   BMI 30.83 kg/m²      Physical Exam  Vitals and nursing note reviewed.   Constitutional:       General: She is not in acute distress.     Appearance: Normal appearance. She is well-developed and normal weight.   HENT:      Head: Normocephalic and atraumatic.      Right Ear: Tympanic membrane, ear canal and external ear normal.      Left Ear: Tympanic membrane, ear canal and external ear normal.      Nose: Nose normal.      Mouth/Throat:      Mouth: Mucous membranes are moist.      Pharynx: Oropharynx is clear.     Eyes:      Conjunctiva/sclera: Conjunctivae normal.      Pupils: Pupils are equal, round, and reactive to light.     Neck:      Thyroid: No thyromegaly or thyroid tenderness.     Cardiovascular:      Rate and Rhythm: Normal rate and regular rhythm.      Pulses:           Radial pulses are 2+ on the right side and 2+ on the left side.      Heart sounds: Normal heart sounds. No murmur heard.  Pulmonary:      Effort: Pulmonary effort is normal. No respiratory distress.      Breath sounds: Normal breath sounds.   Abdominal:      General: Abdomen is flat. Bowel sounds are normal.      Palpations: Abdomen is soft.      Tenderness: There is no abdominal tenderness.     Musculoskeletal:     "  Cervical back: Neck supple.      Right lower leg: No edema.      Left lower leg: No edema.   Lymphadenopathy:      Cervical: No cervical adenopathy.     Skin:     General: Skin is warm and dry.      Capillary Refill: Capillary refill takes less than 2 seconds.     Neurological:      Mental Status: She is alert and oriented to person, place, and time.     Psychiatric:         Mood and Affect: Mood normal.         Behavior: Behavior normal.

## 2025-05-20 PROBLEM — E66.9 OBESITY (BMI 30-39.9): Status: ACTIVE | Noted: 2025-05-20

## 2025-05-20 NOTE — ASSESSMENT & PLAN NOTE
Prior Authorization Clinical Questions for Weight Management Pharmacotherapy    1. Does the patient have a contrainidcation to medication prescribed for weight management?: No  2. Does the patient have a diagnosis of obesity, confirmed by a BMI greater than or equal to 30 kg/m^2?: Yes  3. Does the patient have a BMI of greater than or equal to 27 kg/m^2 with at least one weight-related comorbidity/risk factor/complication (e.g. diabetes, dyslipidemia, coronary artery disease)?: Yes  4. Weight-related co-morbidities/risk factors: dyslipidemia  5. WEGOVY CVA Indication: Does patient have established documented cardiovascular disease (history of a prior heart attack (myocardial infarction), stroke, or symptomatic peripheral arterial disease (PAD)?: No  6. ZEPBOUND MALATHI Indication: Does patient have documented MALATHI diagnosed via sleep study (insurance will require copy of sleep study results for approval)?: No  7. Has the patient been on a weight loss regimen of low-calorie diet, increased physical activity, and lifestyle modifications for a minimum of 6 months?: Yes  8. Has the patient completed a comprehensive weight loss program (ie, Weight Watchers, Noom, Bariatrics, other tam on phone)? If so, what?: No  9. Does the patient have a history of type 2 diabetes?: No  10. Has the member tried and failed other weight loss medication within the past 12 months?: No  11. Will the member use requested medication in combination with another GLP agonist or weight loss drug?: No  12. Is the medication a controlled substance?: Yes  13. If controlled substance, has the PDMP been checked and verified?: Yes     Baseline weight (in pounds): 191 lbs  Current weight (in pounds): 191 lbs  Weight loss percentage: 0%     Discussed weight loss options  Continue dietary modifications and exercising  Start phentermine once daily in morning  Discussed side effects of medication and short term use  She will monitor her anxiety and may require  to discontinue medication if she feels she is heightened taking it.  Referral given as well to weight management for accountability, nutritional guidance to help her develop a system that works for her to be successful in maintaining weight loss  Follow up in 4-6 weeks for weight and BP check    Orders:    phentermine (ADIPEX-P) 37.5 MG tablet; Take 1 tablet (37.5 mg total) by mouth in the morning    Ambulatory Referral to Weight Management; Future

## 2025-05-21 ENCOUNTER — TELEPHONE (OUTPATIENT)
Age: 40
End: 2025-05-21

## 2025-05-22 NOTE — TELEPHONE ENCOUNTER
PA for Phentermine (ADIPEX-P) 37.5 MG tablet EXCLUDED from plan       Reason:(Screenshot if applicable)        Message sent to office clinical pool Yes

## 2025-05-23 ENCOUNTER — RESULTS FOLLOW-UP (OUTPATIENT)
Dept: FAMILY MEDICINE CLINIC | Facility: CLINIC | Age: 40
End: 2025-05-23

## 2025-05-23 LAB
ALBUMIN SERPL-MCNC: 4.6 G/DL (ref 3.9–4.9)
ALP SERPL-CCNC: 88 IU/L (ref 44–121)
ALT SERPL-CCNC: 25 IU/L (ref 0–32)
AST SERPL-CCNC: 27 IU/L (ref 0–40)
BASOPHILS # BLD AUTO: 0 X10E3/UL (ref 0–0.2)
BASOPHILS NFR BLD AUTO: 1 %
BILIRUB SERPL-MCNC: 0.9 MG/DL (ref 0–1.2)
BUN SERPL-MCNC: 11 MG/DL (ref 6–24)
BUN/CREAT SERPL: 17 (ref 9–23)
CALCIUM SERPL-MCNC: 9.4 MG/DL (ref 8.7–10.2)
CHLORIDE SERPL-SCNC: 95 MMOL/L (ref 96–106)
CHOLEST SERPL-MCNC: 306 MG/DL (ref 100–199)
CO2 SERPL-SCNC: 21 MMOL/L (ref 20–29)
CREAT SERPL-MCNC: 0.63 MG/DL (ref 0.57–1)
EGFR: 115 ML/MIN/1.73
EOSINOPHIL # BLD AUTO: 0.1 X10E3/UL (ref 0–0.4)
EOSINOPHIL NFR BLD AUTO: 1 %
ERYTHROCYTE [DISTWIDTH] IN BLOOD BY AUTOMATED COUNT: 12.7 % (ref 11.7–15.4)
GLOBULIN SER-MCNC: 3.1 G/DL (ref 1.5–4.5)
GLUCOSE SERPL-MCNC: 89 MG/DL (ref 70–99)
HCT VFR BLD AUTO: 40.2 % (ref 34–46.6)
HDLC SERPL-MCNC: 69 MG/DL
HGB BLD-MCNC: 13.4 G/DL (ref 11.1–15.9)
IMM GRANULOCYTES # BLD: 0 X10E3/UL (ref 0–0.1)
IMM GRANULOCYTES NFR BLD: 0 %
LDL CALC COMMENT: ABNORMAL
LDLC SERPL CALC-MCNC: 222 MG/DL (ref 0–99)
LDLC/HDLC SERPL: 3.2 RATIO (ref 0–3.2)
LYMPHOCYTES # BLD AUTO: 1.7 X10E3/UL (ref 0.7–3.1)
LYMPHOCYTES NFR BLD AUTO: 26 %
MCH RBC QN AUTO: 31.8 PG (ref 26.6–33)
MCHC RBC AUTO-ENTMCNC: 33.3 G/DL (ref 31.5–35.7)
MCV RBC AUTO: 96 FL (ref 79–97)
MONOCYTES # BLD AUTO: 0.5 X10E3/UL (ref 0.1–0.9)
MONOCYTES NFR BLD AUTO: 8 %
NEUTROPHILS # BLD AUTO: 4.1 X10E3/UL (ref 1.4–7)
NEUTROPHILS NFR BLD AUTO: 64 %
PLATELET # BLD AUTO: 209 X10E3/UL (ref 150–450)
POTASSIUM SERPL-SCNC: 4.2 MMOL/L (ref 3.5–5.2)
PROT SERPL-MCNC: 7.7 G/DL (ref 6–8.5)
RBC # BLD AUTO: 4.21 X10E6/UL (ref 3.77–5.28)
SL AMB VLDL CHOLESTEROL CALC: 15 MG/DL (ref 5–40)
SODIUM SERPL-SCNC: 133 MMOL/L (ref 134–144)
TRIGL SERPL-MCNC: 90 MG/DL (ref 0–149)
TSH SERPL DL<=0.005 MIU/L-ACNC: 2.06 UIU/ML (ref 0.45–4.5)
WBC # BLD AUTO: 6.4 X10E3/UL (ref 3.4–10.8)

## 2025-05-23 NOTE — TELEPHONE ENCOUNTER
Problem: Skin Integrity Alteration  Goal: Skin remains intact with no new/deterioration of wound or pressure injury  Outcome: Monitoring/Evaluating progress  Goal: Participates in wound care activities  Outcome: Monitoring/Evaluating progress  Goal: Comfort optimized with pressure injury prevention strategies guided by patient/family preference. (Hospice)  Outcome: Monitoring/Evaluating progress  Goal: Wound care provided to promote comfort needs (Hospice)  Outcome: Monitoring/Evaluating progress     Problem: Breathing Pattern Ineffective  Goal: Air exchange is effective, demonstrated by Sp02 sat of greater then or = 92% (or as ordered)  Outcome: Monitoring/Evaluating progress  Goal: Respiratory pattern is quiet and regular without report of SOB  Outcome: Monitoring/Evaluating progress  Goal: Breathing pattern demonstrates minimal apnea during sleep with appropriate use of airway pressure support devices  Outcome: Monitoring/Evaluating progress  Goal: Verbalizes/demonstrates effective breathing management strategies  Description: Document education using the patient education activity.   Outcome: Monitoring/Evaluating progress  Goal: Minimize respiratory effort related to dyspnea/shortness of breath (Hospice)  Outcome: Monitoring/Evaluating progress     Problem: Pneumonia  Goal: S/S of acute pneumonia are resolved  Description: If acute pneumonia is present, monitor for resolution of fever, cough, secretions and other test values based on presentation.  Outcome: Monitoring/Evaluating progress  Goal: Verbalizes understanding of pneumonia, treatment, and ongoing prevention  Description: Document on Patient Education Activity  Outcome: Monitoring/Evaluating progress     Problem: Artificial Airway Management  Goal: # Maintains effective artificial airway  Outcome: Monitoring/Evaluating progress  Goal: # Maintains skin integrity around the airway  Outcome: Monitoring/Evaluating progress  Goal: # Tolerates Activity/ADL  ----- Message from GRANT Trivedi sent at 5/23/2025  2:45 PM EDT -----  Franklin Klein, Your labs show your cholesterol is elevated higher than a year ago. The way your numbers are suggests it is genetic. We can see how you do again in 6 months and if no improvement to   consider a statin. We can talk more in July at your office visit  ----- Message -----  From: [facility] Results In  Sent: 5/23/2025   4:05 AM EDT  To: GRANT Berger     without s/s of intolerance  Description: Monitor patient tolerance of the artificial airway while they perform activities and ADLs include bathing, showering, shaving, and eating.  Outcome: Monitoring/Evaluating progress  Goal: Verbalizes understanding of artifical airway function and care  Description: Document on Patient Education Activity  Outcome: Monitoring/Evaluating progress  Goal: Demonstrates ability to manage Trach: site care, suctioning, trach kim care & inner cannula care if needed.  Description: Document on Patient Education Activity    Outcome: Monitoring/Evaluating progress  Goal: Demonstrates ability to perform Trach cuff maintenance  Description: Document on Patient Education Activity    Outcome: Monitoring/Evaluating progress  Goal: Demonstrates ability to manage Laryngectomy: stoma care, suctioning, and humidification  Description: Document on Patient Education Activity  Outcome: Monitoring/Evaluating progress  Goal: Demonstrates ability to manage communication (speaking valve or cork insertion)  Description: Document on Patient Education Activity  Outcome: Monitoring/Evaluating progress

## 2025-07-17 ENCOUNTER — TELEMEDICINE (OUTPATIENT)
Dept: FAMILY MEDICINE CLINIC | Facility: CLINIC | Age: 40
End: 2025-07-17
Payer: COMMERCIAL

## 2025-07-17 DIAGNOSIS — E66.9 OBESITY (BMI 30-39.9): ICD-10-CM

## 2025-07-17 DIAGNOSIS — E78.01 FAMILIAL HYPERCHOLESTEREMIA: Primary | ICD-10-CM

## 2025-07-17 PROCEDURE — 99213 OFFICE O/P EST LOW 20 MIN: CPT | Performed by: NURSE PRACTITIONER

## 2025-07-17 RX ORDER — PHENTERMINE HYDROCHLORIDE 37.5 MG/1
37.5 TABLET ORAL DAILY
Qty: 30 TABLET | Refills: 0 | Status: SHIPPED | OUTPATIENT
Start: 2025-07-17

## 2025-07-17 NOTE — PROGRESS NOTES
Virtual Regular Visit  Name: Latoya Gallardo      : 1985      MRN: [id number]  Encounter Provider: GRANT Berger  Encounter Date: 2025   Encounter department: [facility]  :  Assessment & Plan  Obesity (BMI 30-39.9)  No adverse effect  Continue phentermine  Follow up in 3 months with weight check in office  Call office or send [employer] message with BP reading  Orders:    phentermine (ADIPEX-P) 37.5 MG tablet; Take 1 tablet (37.5 mg total) by mouth in the morning    Familial hypercholesteremia  Check CT calcium score  Suspect familial hypercholesteremia would benefit from statin therapy  Once calcium score resulted will discuss statin further  Orders:    CT coronary calcium score; Future        History of Present Illness       Presenting today for med check since starting phentermine  Only taking phentermine 1/2 tab daily  She has decreased her caffeine intake  She feels it is helping buffer food noise, she is eating less  Unsure if she has lost weight yet  No adverse effects    Reviewed recent labs  25:  HDL 69  TG 90  CMP Na+ 133 otherwise stable  Cbc and tsh normal          Review of Systems   Constitutional:  Positive for unexpected weight change. Negative for chills and fever.   HENT: Negative.  Negative for ear pain and sore throat.    Eyes:  Negative for pain and visual disturbance.   Respiratory: Negative.  Negative for cough and shortness of breath.    Cardiovascular: Negative.  Negative for chest pain and palpitations.   Gastrointestinal: Negative.  Negative for abdominal pain and vomiting.   Endocrine: Negative.    Genitourinary: Negative.  Negative for dysuria and hematuria.   Musculoskeletal: Negative.  Negative for arthralgias and back pain.   Skin:  Negative for color change and rash.   Neurological: Negative.  Negative for seizures and syncope.   Psychiatric/Behavioral:  Positive for dysphoric mood and sleep disturbance. Negative for  suicidal ideas. The patient is nervous/anxious.         Feeling much better since increasing zoloft     All other systems reviewed and are negative.      Objective   There were no vitals taken for this visit.    Physical Exam  Vitals reviewed.   Constitutional:       General: She is not in acute distress.     Appearance: Normal appearance.   HENT:      Head: Normocephalic and atraumatic.     Eyes:      General: No scleral icterus.     Conjunctiva/sclera: Conjunctivae normal.     Pulmonary:      Effort: Pulmonary effort is normal. No respiratory distress.     Musculoskeletal:      Cervical back: Normal range of motion.     Skin:     Findings: No rash.     Neurological:      Mental Status: She is alert and oriented to person, place, and time.     Psychiatric:         Mood and Affect: Mood normal.         Behavior: Behavior normal.         Administrative Statements   Encounter provider GRANT Berger    The Patient is located at Home and in the following state in which I hold an active license [place].    The patient was identified by name and date of birth. Latoya Gallardo was informed that this is a telemedicine visit and that the visit is being conducted through the Epic Embedded platform. She agrees to proceed..  My office door was closed. No one else was in the room.  She acknowledged consent and understanding of privacy and security of the video platform. The patient has agreed to participate and understands they can discontinue the visit at any time.    I have spent a total time of 15 minutes in caring for this patient on the day of the visit/encounter including Risks and benefits of tx options, Instructions for management, Patient and family education, Importance of tx compliance, Impressions, Documenting in the medical record, Reviewing/placing orders in the medical record (including tests, medications, and/or procedures), and Obtaining or reviewing history  , not including the time spent for  establishing the audio/video connection.

## 2025-07-19 NOTE — ASSESSMENT & PLAN NOTE
No adverse effect  Continue phentermine  Follow up in 3 months with weight check in office  Call office or send [employer] message with BP reading  Orders:    phentermine (ADIPEX-P) 37.5 MG tablet; Take 1 tablet (37.5 mg total) by mouth in the morning